# Patient Record
Sex: FEMALE | Race: WHITE | NOT HISPANIC OR LATINO
[De-identification: names, ages, dates, MRNs, and addresses within clinical notes are randomized per-mention and may not be internally consistent; named-entity substitution may affect disease eponyms.]

---

## 2017-01-18 ENCOUNTER — APPOINTMENT (OUTPATIENT)
Dept: BARIATRICS | Facility: CLINIC | Age: 67
End: 2017-01-18

## 2017-01-18 VITALS
SYSTOLIC BLOOD PRESSURE: 140 MMHG | HEIGHT: 61 IN | BODY MASS INDEX: 29.07 KG/M2 | DIASTOLIC BLOOD PRESSURE: 82 MMHG | WEIGHT: 154 LBS

## 2017-02-01 ENCOUNTER — APPOINTMENT (OUTPATIENT)
Dept: GASTROENTEROLOGY | Facility: CLINIC | Age: 67
End: 2017-02-01

## 2017-03-20 ENCOUNTER — APPOINTMENT (OUTPATIENT)
Dept: OPHTHALMOLOGY | Facility: CLINIC | Age: 67
End: 2017-03-20

## 2017-03-20 DIAGNOSIS — H53.8 OTHER VISUAL DISTURBANCES: ICD-10-CM

## 2019-01-09 ENCOUNTER — OUTPATIENT (OUTPATIENT)
Dept: OUTPATIENT SERVICES | Facility: HOSPITAL | Age: 69
LOS: 1 days | End: 2019-01-09
Payer: MEDICARE

## 2019-01-09 ENCOUNTER — APPOINTMENT (OUTPATIENT)
Dept: BARIATRICS | Facility: CLINIC | Age: 69
End: 2019-01-09
Payer: MEDICARE

## 2019-01-09 VITALS
SYSTOLIC BLOOD PRESSURE: 132 MMHG | HEART RATE: 64 BPM | BODY MASS INDEX: 29.51 KG/M2 | WEIGHT: 156.31 LBS | HEIGHT: 61 IN | OXYGEN SATURATION: 99 % | DIASTOLIC BLOOD PRESSURE: 78 MMHG

## 2019-01-09 DIAGNOSIS — K21.9 GASTRO-ESOPHAGEAL REFLUX DISEASE WITHOUT ESOPHAGITIS: ICD-10-CM

## 2019-01-09 DIAGNOSIS — E66.3 OVERWEIGHT: ICD-10-CM

## 2019-01-09 DIAGNOSIS — Z98.84 BARIATRIC SURGERY STATUS: ICD-10-CM

## 2019-01-09 PROCEDURE — 74220 X-RAY XM ESOPHAGUS 1CNTRST: CPT

## 2019-01-09 PROCEDURE — 99214 OFFICE O/P EST MOD 30 MIN: CPT

## 2019-01-09 PROCEDURE — 74220 X-RAY XM ESOPHAGUS 1CNTRST: CPT | Mod: 26

## 2019-01-09 NOTE — REVIEW OF SYSTEMS
[Recent Change In Weight] : ~T recent weight change [Reflux/Heartburn] : reflux/heartburn [Negative] : Endocrine [Fever] : no fever [Chills] : no chills [Dysphagia] : no dysphagia [Abdominal Pain] : no abdominal pain [Vomiting] : no vomiting [Constipation] : no constipation [Diarrhea] : no diarrhea [FreeTextEntry2] : weight gain  [FreeTextEntry7] : history of mild GERD symptoms. Stable .

## 2019-01-09 NOTE — DATA REVIEWED
[FreeTextEntry1] : EXAM: ESOPHAGUS \par \par \par \par \par \par PROCEDURE DATE: 01/09/2019 \par \par \par \par INTERPRETATION: Clinical information: Morbid obesity, bariatric surgery \par status \par \par  view demonstrates lap band left upper quadrant, lap band port, left \par lower quadrant. This exam was video recorded. \par \par Under fluoroscopic observation the patient ingested barium. There was no \par evidence of obstruction. Barium flowed through the esophagus, passed the lap \par band, and filled the stomach. \par \par There was no evidence of prolapse. \par \par 20 minute delayed image shows contrast in the stomach, no passage into \par small bowel. \par \par IMPRESSION: No evidence of prolapse. No evidence of obstruction. \par \par \par \par \par \par \par \par \par SHIRA KAM M.D.,ATTENDING RADIOLOGIST \par This document has been electronically signed. Jan 9 2019 11:33AM \par

## 2019-01-09 NOTE — HISTORY OF PRESENT ILLNESS
[Procedure: ___] : Procedure performed: [unfilled]  [Date of Surgery: ___] : Date of Surgery:   [unfilled] [Surgeon Name:   ___] : Surgeon Name: Dr. OLIVAS [Pre-Op Weight ___] : Pre-op weight was [unfilled] lbs [de-identified] : 68 year old F s/p lap band surgery 10 CM- here for follow up visit. Weight gain since last visit. Pt wants to get back on track and admits she makes some poor food choices at times.Complaints of occasional day time reflux symptoms. Denies nocturnal reflux symptoms. Most recent Upper EGD - 1 year ago - Dr. Knapp - plan to forward report and pathology to office. Pt was instructed to follow up within the next 3 months and discuss whether EGD needs to be repeated. Pt continues to work on consuming consistent protein focus meals and consuming a sufficient amount of zero calorie liquid per day.Currently following up with a private nutritionist.  No change in BM. No nausea or vomiting. Plan to increase daily exercise incorporating cardio and strength training. Pt denies port site pain. Pt is not requesting a slight lap band adjustment today .\par

## 2019-01-09 NOTE — ASSESSMENT
[FreeTextEntry1] : 68 year old F s/p lap band surgery 10 CM- here for follow up visit. Weight gain since last visit. History of mild GERD symptoms. \par \par VE PERFORMED TODAY- NO OBSTRUCTION. NO PROLAPSE. MILD POUCH DILATATION.\par \par No Lap band Adjustment performed today. \par \par Nutritional counseling has been provided. The patient is encouraged to remain calorie conscious and continue a low fat, low carbohydrate, protein focus diet. Pt encouraged to participate in a daily exercise regimen incorporating cardio and  strength training. Encouraged to keep a food journal.\par \par Discussed and reviewed with patient foods to avoid that exacerbate GERD symptoms. Recommended that pt take an OTC H2 blocker or PPI as needed for symptoms. \par \par Return to office in 6 MONTHS after visit with GI / and or repeat Upper EGD.\par

## 2019-01-09 NOTE — PHYSICAL EXAM
[Obese, well nourished, in no acute distress] : obese, well nourished, in no acute distress [Normal] : affect appropriate [de-identified] : normoactive bowel sounds, soft and non tender, no hepatosplenomegaly or masses appreciated.

## 2019-02-10 ENCOUNTER — EMERGENCY (EMERGENCY)
Facility: HOSPITAL | Age: 69
LOS: 1 days | Discharge: HOME | End: 2019-02-10
Attending: EMERGENCY MEDICINE | Admitting: EMERGENCY MEDICINE
Payer: MEDICARE

## 2019-02-10 VITALS
RESPIRATION RATE: 16 BRPM | WEIGHT: 160.06 LBS | TEMPERATURE: 98 F | HEIGHT: 60 IN | OXYGEN SATURATION: 98 % | DIASTOLIC BLOOD PRESSURE: 87 MMHG | SYSTOLIC BLOOD PRESSURE: 152 MMHG | HEART RATE: 64 BPM

## 2019-02-10 DIAGNOSIS — Z96.643 PRESENCE OF ARTIFICIAL HIP JOINT, BILATERAL: Chronic | ICD-10-CM

## 2019-02-10 DIAGNOSIS — Z98.84 BARIATRIC SURGERY STATUS: Chronic | ICD-10-CM

## 2019-02-10 DIAGNOSIS — Z90.710 ACQUIRED ABSENCE OF BOTH CERVIX AND UTERUS: Chronic | ICD-10-CM

## 2019-02-10 LAB
ALBUMIN SERPL ELPH-MCNC: 3.5 G/DL — SIGNIFICANT CHANGE UP (ref 3.3–5)
ALP SERPL-CCNC: 97 U/L — SIGNIFICANT CHANGE UP (ref 30–120)
ALT FLD-CCNC: 64 U/L DA — HIGH (ref 10–60)
ANION GAP SERPL CALC-SCNC: 12 MMOL/L — SIGNIFICANT CHANGE UP (ref 5–17)
APPEARANCE UR: ABNORMAL
APTT BLD: 29 SEC — SIGNIFICANT CHANGE UP (ref 28.5–37)
AST SERPL-CCNC: 36 U/L — SIGNIFICANT CHANGE UP (ref 10–40)
BASOPHILS # BLD AUTO: 0.04 K/UL — SIGNIFICANT CHANGE UP (ref 0–0.2)
BASOPHILS NFR BLD AUTO: 0.6 % — SIGNIFICANT CHANGE UP (ref 0–2)
BILIRUB SERPL-MCNC: 0.2 MG/DL — SIGNIFICANT CHANGE UP (ref 0.2–1.2)
BILIRUB UR-MCNC: NEGATIVE — SIGNIFICANT CHANGE UP
BUN SERPL-MCNC: 23 MG/DL — SIGNIFICANT CHANGE UP (ref 7–23)
CALCIUM SERPL-MCNC: 9.4 MG/DL — SIGNIFICANT CHANGE UP (ref 8.4–10.5)
CHLORIDE SERPL-SCNC: 108 MMOL/L — SIGNIFICANT CHANGE UP (ref 96–108)
CK SERPL-CCNC: 99 U/L — SIGNIFICANT CHANGE UP (ref 26–192)
CO2 SERPL-SCNC: 25 MMOL/L — SIGNIFICANT CHANGE UP (ref 22–31)
COLOR SPEC: YELLOW — SIGNIFICANT CHANGE UP
CREAT SERPL-MCNC: 1.07 MG/DL — SIGNIFICANT CHANGE UP (ref 0.5–1.3)
D DIMER BLD IA.RAPID-MCNC: <150 NG/ML DDU — SIGNIFICANT CHANGE UP
DIFF PNL FLD: ABNORMAL
EOSINOPHIL # BLD AUTO: 0.12 K/UL — SIGNIFICANT CHANGE UP (ref 0–0.5)
EOSINOPHIL NFR BLD AUTO: 1.9 % — SIGNIFICANT CHANGE UP (ref 0–6)
GLUCOSE SERPL-MCNC: 90 MG/DL — SIGNIFICANT CHANGE UP (ref 70–99)
GLUCOSE UR QL: NEGATIVE MG/DL — SIGNIFICANT CHANGE UP
HCT VFR BLD CALC: 40.4 % — SIGNIFICANT CHANGE UP (ref 34.5–45)
HGB BLD-MCNC: 13 G/DL — SIGNIFICANT CHANGE UP (ref 11.5–15.5)
IMM GRANULOCYTES NFR BLD AUTO: 0.2 % — SIGNIFICANT CHANGE UP (ref 0–1.5)
INR BLD: 1.05 RATIO — SIGNIFICANT CHANGE UP (ref 0.88–1.16)
KETONES UR-MCNC: NEGATIVE — SIGNIFICANT CHANGE UP
LEUKOCYTE ESTERASE UR-ACNC: ABNORMAL
LIDOCAIN IGE QN: 261 U/L — SIGNIFICANT CHANGE UP (ref 73–393)
LYMPHOCYTES # BLD AUTO: 2.16 K/UL — SIGNIFICANT CHANGE UP (ref 1–3.3)
LYMPHOCYTES # BLD AUTO: 33.8 % — SIGNIFICANT CHANGE UP (ref 13–44)
MCHC RBC-ENTMCNC: 30.2 PG — SIGNIFICANT CHANGE UP (ref 27–34)
MCHC RBC-ENTMCNC: 32.2 GM/DL — SIGNIFICANT CHANGE UP (ref 32–36)
MCV RBC AUTO: 93.7 FL — SIGNIFICANT CHANGE UP (ref 80–100)
MONOCYTES # BLD AUTO: 0.52 K/UL — SIGNIFICANT CHANGE UP (ref 0–0.9)
MONOCYTES NFR BLD AUTO: 8.1 % — SIGNIFICANT CHANGE UP (ref 2–14)
NEUTROPHILS # BLD AUTO: 3.55 K/UL — SIGNIFICANT CHANGE UP (ref 1.8–7.4)
NEUTROPHILS NFR BLD AUTO: 55.4 % — SIGNIFICANT CHANGE UP (ref 43–77)
NITRITE UR-MCNC: NEGATIVE — SIGNIFICANT CHANGE UP
PH UR: 8 — SIGNIFICANT CHANGE UP (ref 5–8)
PLATELET # BLD AUTO: 222 K/UL — SIGNIFICANT CHANGE UP (ref 150–400)
POTASSIUM SERPL-MCNC: 3.7 MMOL/L — SIGNIFICANT CHANGE UP (ref 3.5–5.3)
POTASSIUM SERPL-SCNC: 3.7 MMOL/L — SIGNIFICANT CHANGE UP (ref 3.5–5.3)
PROT SERPL-MCNC: 6.9 G/DL — SIGNIFICANT CHANGE UP (ref 6–8.3)
PROT UR-MCNC: NEGATIVE MG/DL — SIGNIFICANT CHANGE UP
PROTHROM AB SERPL-ACNC: 11.5 SEC — SIGNIFICANT CHANGE UP (ref 10–12.9)
RBC # BLD: 4.31 M/UL — SIGNIFICANT CHANGE UP (ref 3.8–5.2)
RBC # FLD: 13.1 % — SIGNIFICANT CHANGE UP (ref 10.3–14.5)
SODIUM SERPL-SCNC: 145 MMOL/L — SIGNIFICANT CHANGE UP (ref 135–145)
SP GR SPEC: 1.01 — SIGNIFICANT CHANGE UP (ref 1.01–1.02)
TROPONIN I SERPL-MCNC: 0 NG/ML — LOW (ref 0.02–0.06)
TROPONIN I SERPL-MCNC: 0 NG/ML — LOW (ref 0.02–0.06)
UROBILINOGEN FLD QL: NEGATIVE MG/DL — SIGNIFICANT CHANGE UP
WBC # BLD: 6.4 K/UL — SIGNIFICANT CHANGE UP (ref 3.8–10.5)
WBC # FLD AUTO: 6.4 K/UL — SIGNIFICANT CHANGE UP (ref 3.8–10.5)

## 2019-02-10 PROCEDURE — 71046 X-RAY EXAM CHEST 2 VIEWS: CPT | Mod: 26

## 2019-02-10 PROCEDURE — 99284 EMERGENCY DEPT VISIT MOD MDM: CPT

## 2019-02-10 PROCEDURE — 93010 ELECTROCARDIOGRAM REPORT: CPT

## 2019-02-10 PROCEDURE — 74019 RADEX ABDOMEN 2 VIEWS: CPT | Mod: 26

## 2019-02-10 RX ORDER — PANTOPRAZOLE SODIUM 20 MG/1
40 TABLET, DELAYED RELEASE ORAL ONCE
Qty: 0 | Refills: 0 | Status: COMPLETED | OUTPATIENT
Start: 2019-02-10 | End: 2019-02-10

## 2019-02-10 RX ORDER — SODIUM CHLORIDE 9 MG/ML
1000 INJECTION INTRAMUSCULAR; INTRAVENOUS; SUBCUTANEOUS ONCE
Qty: 0 | Refills: 0 | Status: COMPLETED | OUTPATIENT
Start: 2019-02-10 | End: 2019-02-10

## 2019-02-10 RX ORDER — FAMOTIDINE 10 MG/ML
20 INJECTION INTRAVENOUS ONCE
Qty: 0 | Refills: 0 | Status: COMPLETED | OUTPATIENT
Start: 2019-02-10 | End: 2019-02-10

## 2019-02-10 RX ORDER — SODIUM CHLORIDE 9 MG/ML
3 INJECTION INTRAMUSCULAR; INTRAVENOUS; SUBCUTANEOUS ONCE
Qty: 0 | Refills: 0 | Status: COMPLETED | OUTPATIENT
Start: 2019-02-10 | End: 2019-02-10

## 2019-02-10 RX ADMIN — SODIUM CHLORIDE 3 MILLILITER(S): 9 INJECTION INTRAMUSCULAR; INTRAVENOUS; SUBCUTANEOUS at 23:10

## 2019-02-10 RX ADMIN — SODIUM CHLORIDE 1000 MILLILITER(S): 9 INJECTION INTRAMUSCULAR; INTRAVENOUS; SUBCUTANEOUS at 23:10

## 2019-02-10 RX ADMIN — FAMOTIDINE 20 MILLIGRAM(S): 10 INJECTION INTRAVENOUS at 23:18

## 2019-02-10 RX ADMIN — PANTOPRAZOLE SODIUM 40 MILLIGRAM(S): 20 TABLET, DELAYED RELEASE ORAL at 23:18

## 2019-02-10 NOTE — ED PROVIDER NOTE - CLINICAL SUMMARY MEDICAL DECISION MAKING FREE TEXT BOX
68 y.o. F with lap band c/o epigastric pain, related to lap band vs atypical cardiac presentation - iv, labs, ekg, cxr/axr, will d/w bariatric service and reassess

## 2019-02-10 NOTE — ED ADULT TRIAGE NOTE - PAIN RATING/NUMBER SCALE (0-10): ACTIVITY
CC:  Marya Grant is here today for vaginal check, wart treatment.  Patient states she feels like it is worse.   Medications: medications verified and updated  Refills needed today?  NO  denies Latex allergy or sensitivity  Tobacco history: verified    PMD - Delano De La Rosa MD      Patient would like communication of their results via:    [facility]    Patient's current [facility] status: Active.  Health Maintenance   Topic Date Due   • Depression Screening  02/24/1994   • Influenza Vaccine (1) 09/01/2017   • DTaP/Tdap/Td Vaccine (2 - Td) 04/10/2018   • Cervical Cancer Screening HPV CO-Testing  06/25/2019        6

## 2019-02-10 NOTE — ED ADULT NURSE NOTE - OBJECTIVE STATEMENT
Patient states she over ate yesterday describes it as a binge. Tonight she has pain under her left breast in the area of her lap band and her left shoulder. with nausea and belching with foul taste and smell.

## 2019-02-10 NOTE — ED ADULT NURSE NOTE - NSIMPLEMENTINTERV_GEN_ALL_ED
Implemented All Universal Safety Interventions:  [place] to call system. Call bell, personal items and telephone within reach. Instruct patient to call for assistance. Room bathroom lighting operational. Non-slip footwear when patient is off stretcher. Physically safe environment: no spills, clutter or unnecessary equipment. Stretcher in lowest position, wheels locked, appropriate side rails in place.

## 2019-02-10 NOTE — ED ADULT NURSE NOTE - CHIEF COMPLAINT QUOTE
" Every time I inhale ,  I have pain under my left breast area - and sometimes over my  left shoulder - started few hours ago. " Hx lap band 2003

## 2019-02-10 NOTE — ED ADULT NURSE NOTE - ED STAT RN HANDOFF DETAILS
Patient in x-ray, labs pending results. IV infusing as ordered. patient endorsed to Eric Phillips RN

## 2019-02-10 NOTE — ED ADULT NURSE NOTE - GENITOURINARY ASSESSMENT
Left message for patient to call office back and schedule appointment with Christian Modi prior to additional refills. Please assist him in scheduling appointment. - - -

## 2019-02-10 NOTE — ED PROVIDER NOTE - OBJECTIVE STATEMENT
69 y/o F pt with hx of lap band, hysterectomy, b/l hip replacements, hypothyroidism, GERD, and depression presents to the ED c/o pain under the left breast with deep breathing since 6pm today. Pain also radiates to the left shoulder pain. 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 Pt states that she ate a larger quantity of food than normal last night for dinner and felt uncomfortable. Pt states that she ate some chili today for dinner and then went to her chorus practice. Pt states that she called a hotline and was referred to the ED for workup. She states that the pain has improved since arrival to the ED. Pt states that she had a bariatric work-up a couple of weeks ago in [place] ED. Non-smoker, no EtOH use. Allergic to penicillin. Denies fever, chills, nausea, vomiting, or chest pain. No other complaints at this time.    Dr. Jiménez- gastric surgeon

## 2019-02-11 VITALS
TEMPERATURE: 98 F | DIASTOLIC BLOOD PRESSURE: 80 MMHG | RESPIRATION RATE: 16 BRPM | OXYGEN SATURATION: 99 % | HEART RATE: 67 BPM | SYSTOLIC BLOOD PRESSURE: 135 MMHG

## 2019-02-11 PROCEDURE — 74019 RADEX ABDOMEN 2 VIEWS: CPT

## 2019-02-11 PROCEDURE — 93005 ELECTROCARDIOGRAM TRACING: CPT

## 2019-02-11 PROCEDURE — 82550 ASSAY OF CK (CPK): CPT

## 2019-02-11 PROCEDURE — 96361 HYDRATE IV INFUSION ADD-ON: CPT

## 2019-02-11 PROCEDURE — 96374 THER/PROPH/DIAG INJ IV PUSH: CPT

## 2019-02-11 PROCEDURE — 81001 URINALYSIS AUTO W/SCOPE: CPT

## 2019-02-11 PROCEDURE — 99284 EMERGENCY DEPT VISIT MOD MDM: CPT | Mod: 25

## 2019-02-11 PROCEDURE — 80053 COMPREHEN METABOLIC PANEL: CPT

## 2019-02-11 PROCEDURE — 36415 COLL VENOUS BLD VENIPUNCTURE: CPT

## 2019-02-11 PROCEDURE — 83690 ASSAY OF LIPASE: CPT

## 2019-02-11 PROCEDURE — 85730 THROMBOPLASTIN TIME PARTIAL: CPT

## 2019-02-11 PROCEDURE — 85027 COMPLETE CBC AUTOMATED: CPT

## 2019-02-11 PROCEDURE — 71046 X-RAY EXAM CHEST 2 VIEWS: CPT

## 2019-02-11 PROCEDURE — 85379 FIBRIN DEGRADATION QUANT: CPT

## 2019-02-11 PROCEDURE — 84484 ASSAY OF TROPONIN QUANT: CPT

## 2019-02-11 PROCEDURE — 85610 PROTHROMBIN TIME: CPT

## 2019-02-11 RX ADMIN — SODIUM CHLORIDE 1000 MILLILITER(S): 9 INJECTION INTRAMUSCULAR; INTRAVENOUS; SUBCUTANEOUS at 00:10

## 2019-02-11 RX ADMIN — Medication 30 MILLILITER(S): at 00:10

## 2019-04-09 ENCOUNTER — EMERGENCY (EMERGENCY)
Facility: HOSPITAL | Age: 69
LOS: 1 days | Discharge: HOME | End: 2019-04-09
Attending: EMERGENCY MEDICINE | Admitting: STUDENT IN AN ORGANIZED HEALTH CARE EDUCATION/TRAINING PROGRAM
Payer: MEDICARE

## 2019-04-09 VITALS
DIASTOLIC BLOOD PRESSURE: 85 MMHG | HEART RATE: 59 BPM | RESPIRATION RATE: 18 BRPM | WEIGHT: 160.06 LBS | OXYGEN SATURATION: 97 % | TEMPERATURE: 98 F | SYSTOLIC BLOOD PRESSURE: 151 MMHG

## 2019-04-09 DIAGNOSIS — Z90.710 ACQUIRED ABSENCE OF BOTH CERVIX AND UTERUS: Chronic | ICD-10-CM

## 2019-04-09 DIAGNOSIS — Z98.84 BARIATRIC SURGERY STATUS: Chronic | ICD-10-CM

## 2019-04-09 DIAGNOSIS — Z96.643 PRESENCE OF ARTIFICIAL HIP JOINT, BILATERAL: Chronic | ICD-10-CM

## 2019-04-09 PROCEDURE — 99285 EMERGENCY DEPT VISIT HI MDM: CPT | Mod: 25

## 2019-04-09 PROCEDURE — 99156 MOD SED OTH PHYS/QHP 5/>YRS: CPT

## 2019-04-09 PROCEDURE — 73502 X-RAY EXAM HIP UNI 2-3 VIEWS: CPT | Mod: 26,RT

## 2019-04-09 PROCEDURE — 27266 TREAT HIP DISLOCATION: CPT | Mod: RT

## 2019-04-09 PROCEDURE — 73502 X-RAY EXAM HIP UNI 2-3 VIEWS: CPT

## 2019-04-09 PROCEDURE — 99284 EMERGENCY DEPT VISIT MOD MDM: CPT

## 2019-04-09 NOTE — CONSULT NOTE ADULT - SUBJECTIVE AND OBJECTIVE BOX
Patient is a 68F community ambulator without assistive devices who presents to the ED today for a c/o of R hip pain. Patient states she was at the gym when she tripped over a scale and fell onto her right side. She states upon falling she felt a pop in her right hip and was unable to bear weight on her RLE after the injury. Denies HH/LOC. Denies any numbness or tingling. Denies having any other pain elsewhere. Patient is s/p R Hip Hemiarthroplasty w/ Cerclage wire by Dr. Parra ([facility], 2005) and L Hip Hemiarthroplasty by Dr. Parra ([facility], 2004). She admits to 6 or 7 prior dislocations of her R hip usually caused by getting into/out of a car but states it has always "popped back in' and never required a formal reduction. No other orthopedic concerns at this time.    PAST MEDICAL & SURGICAL HISTORY:    Home Medications:    Allergies  penicillins (Rash)    PHYSICAL EXAM:  Vital Signs Last 24 Hrs  T(C): 36.9 (09 Apr 2019 23:08), Max: 36.9 (09 Apr 2019 18:54)  T(F): 98.5 (09 Apr 2019 23:08), Max: 98.5 (09 Apr 2019 23:08)  HR: 76 (09 Apr 2019 23:08) (59 - 76)  BP: 127/81 (09 Apr 2019 23:08) (127/81 - 151/85)  BP(mean): --  RR: 16 (09 Apr 2019 23:08) (16 - 18)  SpO2: 96% (09 Apr 2019 23:08) (96% - 97%)    Gen: NAD; Resting comfortably  RLE:  Shortened and internally rotated  Skin intact; No erythema or ecchymosis  +ttp diffusely around hip  No other bony tenderness to palpation  +EHL/FHL/TA/GSC  SILT L3-S1  +DP  Compartments soft and compressible  No calf tenderness    Secondary Survey:   LLE/RUE/LUE: No TTP over bony prominences, SILT, palpable pulses, full/painless range of motion, compartments soft    Spine: No bony tenderness. No palpable stepoffs.     XR R Hip: Posterior hip dislocation    Procedure:  After informed consent was obtained from the patient, conscious sedation was administered by Anesthesia, and a closed reduction maneuver of her right hip was attempted. Following the reduction, post-reduction xrays were obtained which demonstrated a sucessful reduction and adequate anatomic alignment. Patient was NVI preprocedure and post-procedure. Patient tolerated procedure well without any acute complications. Patient is a 68F community ambulator without assistive devices who presents to the ED today for a c/o of R hip pain. Patient states she was at the gym when she tripped over a scale and fell onto her right side. She states upon falling she felt a pop in her right hip and was unable to bear weight on her RLE after the injury. Denies HH/LOC. Denies any numbness or tingling. Denies having any other pain elsewhere. Patient is s/p R Total Hip Arthroplasty w/ Cerclage wire by Dr. Parra (\[facility]", 2005) and L Total Hip Arthroplasty by Dr. Parra (\[facility]", 2004). She admits to 6 or 7 prior dislocations of her R hip usually caused by getting into/out of a car but states it has always "popped back in' and never required a formal reduction. No other orthopedic concerns at this time.    PAST MEDICAL & SURGICAL HISTORY:    Home Medications:    Allergies  penicillins (Rash)    PHYSICAL EXAM:  Vital Signs Last 24 Hrs  T(C): 36.9 (09 Apr 2019 23:08), Max: 36.9 (09 Apr 2019 18:54)  T(F): 98.5 (09 Apr 2019 23:08), Max: 98.5 (09 Apr 2019 23:08)  HR: 76 (09 Apr 2019 23:08) (59 - 76)  BP: 127/81 (09 Apr 2019 23:08) (127/81 - 151/85)  BP(mean): --  RR: 16 (09 Apr 2019 23:08) (16 - 18)  SpO2: 96% (09 Apr 2019 23:08) (96% - 97%)    Gen: NAD; Resting comfortably  RLE:  Shortened and internally rotated  Skin intact; No erythema or ecchymosis  +ttp diffusely around hip  No other bony tenderness to palpation  +EHL/FHL/TA/GSC  SILT L3-S1  +DP  Compartments soft and compressible  No calf tenderness    Secondary Survey:   LLE/RUE/LUE: No TTP over bony prominences, SILT, palpable pulses, full/painless range of motion, compartments soft    Spine: No bony tenderness. No palpable stepoffs.     XR R Hip: Posterior hip dislocation    Procedure:  After informed consent was obtained from the patient, conscious sedation was administered by Anesthesia, and a closed reduction maneuver of her right hip was attempted. Following the reduction, post-reduction xrays were obtained which demonstrated a sucessful reduction and adequate anatomic alignment. Patient was NVI preprocedure and post-procedure. Patient tolerated procedure well without any acute complications. Patient is a 68F community ambulator without assistive devices who presents to the ED today for a c/o of R hip pain. Patient states she was at the gym when she tripped over a scale and fell onto her right side. She states upon falling she felt a pop in her right hip and was unable to bear weight on her RLE after the injury. Denies HH/LOC. Denies any numbness or tingling. Denies having any other pain elsewhere. Patient is s/p R Total Hip Arthroplasty w/ Cerclage wire by Dr. Parra ([facility], 2005) and L Total Hip Arthroplasty by Dr. Parra ([facility], 2004). She admits to 6 or 7 prior dislocations of her R hip usually caused by getting into/out of a car but states it has always "popped back in' and never required a formal reduction. No other orthopedic concerns at this time.    PAST MEDICAL & SURGICAL HISTORY:  Hypertension  S/P bilateral hip replacements  Depression  History of hypothyroidism  S/P bilateral hip replacements  H/O bilateral hip replacements  History of hysterectomy  H/O bariatric surgery: lap band 2003      Home Medications:  buPROPion 300 mg/24 hours (XL) oral tablet, extended release: 1 tab(s) orally every 24 hours (10 Feb 2019 22:23)  Calcium 600+D oral tablet: 1 tab(s) orally once a day (10 Feb 2019 22:23)  CoQ10: 1 tab(s) orally once a day (10 Feb 2019 22:23)  FLUoxetine: 80 milligram(s) orally once a day (10 Feb 2019 22:23)  levothyroxine 50 mcg (0.05 mg)/mL oral solution: 1 tab(s) orally once a day (10 Feb 2019 22:23)  topiramate 200 mg oral tablet: 1 tab(s) orally 2 times a day (10 Feb 2019 22:23)  Vitamin C 500 mg oral tablet: 1 tab(s) orally once a day (10 Feb 2019 22:23)  Zantac 150 oral tablet: 1 tab(s) orally once a day (10 Feb 2019 22:23)      Allergies  penicillins (Rash)    PHYSICAL EXAM:  Vital Signs Last 24 Hrs  T(C): 36.9 (09 Apr 2019 23:08), Max: 36.9 (09 Apr 2019 18:54)  T(F): 98.5 (09 Apr 2019 23:08), Max: 98.5 (09 Apr 2019 23:08)  HR: 76 (09 Apr 2019 23:08) (59 - 76)  BP: 127/81 (09 Apr 2019 23:08) (127/81 - 151/85)  BP(mean): --  RR: 16 (09 Apr 2019 23:08) (16 - 18)  SpO2: 96% (09 Apr 2019 23:08) (96% - 97%)    Gen: NAD; Resting comfortably  RLE:  Shortened and internally rotated  Skin intact; No erythema or ecchymosis  +ttp diffusely around hip  No other bony tenderness to palpation  +EHL/FHL/TA/GSC  SILT L3-S1  +DP  Compartments soft and compressible  No calf tenderness    Secondary Survey:   LLE/RUE/LUE: No TTP over bony prominences, SILT, palpable pulses, full/painless range of motion, compartments soft    Spine: No bony tenderness. No palpable stepoffs.     XR R Hip: Posterior hip dislocation    Procedure:  After informed consent was obtained from the patient, conscious sedation was administered by Anesthesia, and a closed reduction maneuver of her right hip was attempted. Following the reduction, post-reduction xrays were obtained which demonstrated a sucessful reduction and adequate anatomic alignment. Patient was NVI preprocedure and post-procedure. Patient tolerated procedure well without any acute complications.

## 2019-04-09 NOTE — ED PROVIDER NOTE - NSFOLLOWUPINSTRUCTIONS_ED_ALL_ED_FT
Follow-up at [facility] tomorrow  Wear Knee immobilizer with knee completely extended until evaluated by your Orthopedic Surgeon  Return here if needed

## 2019-04-09 NOTE — ED ADULT TRIAGE NOTE - CHIEF COMPLAINT QUOTE
fell hip pain Bilateral Helical Rim Advancement Flap Text: The defect edges were debeveled with a #15 blade scalpel.  Given the location of the defect and the proximity to free margins (helical rim) a bilateral helical rim advancement flap was deemed most appropriate.  Using a sterile surgical marker, the appropriate advancement flaps were drawn incorporating the defect and placing the expected incisions between the helical rim and antihelix where possible.  The area thus outlined was incised through and through with a #15 scalpel blade.  With a skin hook and iris scissors, the flaps were gently and sharply undermined and freed up.

## 2019-04-09 NOTE — ED PROVIDER NOTE - CONSTITUTIONAL, MLM
normal... Well appearing, older female, well nourished, awake, alert, oriented to person, place, time/situation and in mild apparent distress.

## 2019-04-09 NOTE — ED ADULT NURSE REASSESSMENT NOTE - NS ED NURSE REASSESS COMMENT FT1
Medication pending, pharmacy loaded.   Orthopedic Doctors Remy and Anesthesiologist Dr. Kessler at bedside for conscious sedation procedure, closed reduction right hip.

## 2019-04-09 NOTE — ED PROVIDER NOTE - PROVIDER TOKENS
FREE:[LAST:[[facility]],PHONE:[(   )    -],FAX:[(   )    -]]

## 2019-04-09 NOTE — ED PROVIDER NOTE - PROGRESS NOTE DETAILS
Anesthesia and Ortho service to reduce hip. Alert, awake, ambulated in ED.  Reassessed by anesthesia. Family at bedside to take patient home.

## 2019-04-09 NOTE — ED ADULT NURSE NOTE - NSIMPLEMENTINTERV_GEN_ALL_ED
Implemented All Fall Risk Interventions:  [place] to call system. Call bell, personal items and telephone within reach. Instruct patient to call for assistance. Room bathroom lighting operational. Non-slip footwear when patient is off stretcher. Physically safe environment: no spills, clutter or unnecessary equipment. Stretcher in lowest position, wheels locked, appropriate side rails in place. Provide visual cue, wrist band, yellow gown, etc. Monitor gait and stability. Monitor for mental status changes and reorient to person, place, and time. Review medications for side effects contributing to fall risk. Reinforce activity limits and safety measures with patient and family.

## 2019-04-09 NOTE — ED PROVIDER NOTE - OBJECTIVE STATEMENT
69 yo white female with previous hip surgery and subsequent hip dislocations who tripped this evening and hip right hip. Munising like hip popped out again. Since that time, pain is present and sharp in nature. Unable to bear any weight. Last ate at 1600 today.

## 2019-04-09 NOTE — ED PROVIDER NOTE - CARE PROVIDER_API CALL
[facility],   Phone: (   )    -  Fax: (   )    -  Follow Up Time:

## 2019-04-09 NOTE — CONSULT NOTE ADULT - ASSESSMENT
A/P: 68F w/ R Posterior Hip Dislocation s/p Closed Reduction  Discussed with patient that given history of recurrent dislocations, patient will likely need future revision surgery for possible constrained liner  Abduction/Posterior precautions  Bulky [place]/BROOK  Analgesia  DVT ppx  WBAT in KI  PT/OT  FU with orthopedic surgeon within 1 week  Will discuss with attending and advise if plan changes   Patient aware and in agreement with plan

## 2019-04-10 VITALS
RESPIRATION RATE: 16 BRPM | HEART RATE: 70 BPM | SYSTOLIC BLOOD PRESSURE: 129 MMHG | OXYGEN SATURATION: 96 % | DIASTOLIC BLOOD PRESSURE: 70 MMHG

## 2019-04-10 PROBLEM — F32.9 MAJOR DEPRESSIVE DISORDER, SINGLE EPISODE, UNSPECIFIED: Chronic | Status: ACTIVE | Noted: 2019-02-10

## 2019-04-10 PROBLEM — Z86.39 PERSONAL HISTORY OF OTHER ENDOCRINE, NUTRITIONAL AND METABOLIC DISEASE: Chronic | Status: ACTIVE | Noted: 2019-02-10

## 2019-04-10 PROCEDURE — 73502 X-RAY EXAM HIP UNI 2-3 VIEWS: CPT | Mod: 26,RT

## 2019-04-10 NOTE — ED ADULT NURSE REASSESSMENT NOTE - NS ED NURSE REASSESS COMMENT FT1
Patient went home on stable condition accompanied by  who will drive her home. Patient on knee immobilizer and advised to kept extended until seen by Orthopedics. IV cannula removed.

## 2019-07-02 NOTE — ED ADULT TRIAGE NOTE - BSA (M2)
DEXA still shows osteoporosis despite treatment with Reclast  Will readdress on next visit about other treatment
1.7

## 2019-07-18 PROBLEM — I10 ESSENTIAL (PRIMARY) HYPERTENSION: Chronic | Status: ACTIVE | Noted: 2019-04-09

## 2019-07-23 ENCOUNTER — APPOINTMENT (OUTPATIENT)
Dept: BARIATRICS | Facility: CLINIC | Age: 69
End: 2019-07-23
Payer: MEDICARE

## 2019-07-23 VITALS
HEIGHT: 61 IN | BODY MASS INDEX: 29.47 KG/M2 | WEIGHT: 156.08 LBS | SYSTOLIC BLOOD PRESSURE: 140 MMHG | HEART RATE: 72 BPM | OXYGEN SATURATION: 98 % | DIASTOLIC BLOOD PRESSURE: 80 MMHG

## 2019-07-23 PROCEDURE — 99214 OFFICE O/P EST MOD 30 MIN: CPT

## 2019-07-25 NOTE — ASSESSMENT
[FreeTextEntry1] : 68 year old F s/p lap band surgery 10 CM- here for follow up visit. Weight stable since last visit. Pt reports occasional port site discomfort with core exercises / bending forward. Pt reports discomfort has improved significantly in the last few days. \par \par No Lap band Adjustment performed today. \par \par Nutritional counseling has been provided. The patient is encouraged to remain calorie conscious and continue a low fat, low carbohydrate, protein focus diet. Pt encouraged to participate in a daily exercise regimen incorporating cardio and  strength training. Encouraged to keep a food journal. Pt instructed to apply ice post exercise as needed. \par \par Pt instructed to call office in 1 week to give update on status. \par \par Return to office in 3- 6 MONTHS or sooner if any issues or concerns. Will consider repeating VE next visit if any issues or concerns. \par

## 2019-07-25 NOTE — HISTORY OF PRESENT ILLNESS
[Procedure: ___] : Procedure performed: [unfilled]  [Date of Surgery: ___] : Date of Surgery:   [unfilled] [Surgeon Name:   ___] : Surgeon Name: Dr. OLIVAS [Pre-Op Weight ___] : Pre-op weight was [unfilled] lbs [de-identified] : 68 year old F s/p lap band surgery 10 CM- here for follow up visit. Weight stable since last visit. Pt reports occasional port site discomfort with core exercises / bending forward. Denies any recent trauma. Denies any swelling or redness along area.  Pt continues to work on getting back on track and making healthier food choices since last week.  Denies any reflux symptoms. Pt continues to work on consuming consistent protein focus meals and consuming a sufficient amount of zero calorie liquid per day.Currently following up with a private nutritionist.  No change in BM. No nausea or vomiting. Plan to increase daily exercise incorporating cardio and strength training. Pt denies port site pain. Pt is not requesting a slight lap band adjustment today and is satisfied with level of restriction . History of  Upper EGD - 1 year ago - Dr. Knapp . \par \par Most recent Video Esophagram - 1/9/2019 no obstruction no prolapse. no pouch dilatation. \par

## 2019-07-25 NOTE — REVIEW OF SYSTEMS
[Recent Change In Weight] : ~T recent weight change [Abdominal Pain] : abdominal pain [Negative] : Endocrine [Fever] : no fever [Chills] : no chills [Dysphagia] : no dysphagia [Vomiting] : no vomiting [Constipation] : no constipation [Diarrhea] : no diarrhea [Reflux/Heartburn] : no reflux/ heartburn [FreeTextEntry2] : weight stable [FreeTextEntry7] : occasional port site discomfort with core activity / forward flexion.

## 2019-07-25 NOTE — PHYSICAL EXAM
[Obese, well nourished, in no acute distress] : obese, well nourished, in no acute distress [Normal] : grossly intact [de-identified] : normoactive bowel sounds, soft and non tender, no hepatosplenomegaly or masses appreciated.

## 2019-08-05 ENCOUNTER — APPOINTMENT (OUTPATIENT)
Dept: BARIATRICS | Facility: CLINIC | Age: 69
End: 2019-08-05
Payer: MEDICARE

## 2019-08-05 VITALS
HEART RATE: 71 BPM | OXYGEN SATURATION: 98 % | BODY MASS INDEX: 29.51 KG/M2 | HEIGHT: 61 IN | WEIGHT: 156.31 LBS | SYSTOLIC BLOOD PRESSURE: 120 MMHG | DIASTOLIC BLOOD PRESSURE: 80 MMHG

## 2019-08-05 PROCEDURE — 99214 OFFICE O/P EST MOD 30 MIN: CPT

## 2019-08-05 NOTE — REVIEW OF SYSTEMS
[Recent Change In Weight] : ~T recent weight change [Abdominal Pain] : abdominal pain [Negative] : Endocrine [Fever] : no fever [Chills] : no chills [Dysphagia] : no dysphagia [Constipation] : no constipation [Diarrhea] : no diarrhea [Vomiting] : no vomiting [FreeTextEntry2] : weight stable [Reflux/Heartburn] : no reflux/ heartburn [FreeTextEntry7] : occasional port site discomfort with core activity / forward flexion.

## 2019-08-05 NOTE — HISTORY OF PRESENT ILLNESS
[Procedure: ___] : Procedure performed: [unfilled]  [Date of Surgery: ___] : Date of Surgery:   [unfilled] [Surgeon Name:   ___] : Surgeon Name: Dr. OLIVAS [Pre-Op Weight ___] : Pre-op weight was [unfilled] lbs [de-identified] : 68 year old F s/p lap band surgery 10 CM- here for follow up visit. Weight stable since last visit. Pt here for follow up visit secondary to  occasional port site discomfort with core exercises / bending forward- pt states discomfort has increased recently since last visit.. Denies any recent trauma. Slight improvement in discomfort since last visit.Denies any swelling or redness along area.  Pt continues to work on getting back on track and making healthier food choices since last week.  Denies any reflux symptoms. Pt continues to work on consuming consistent protein focus meals and consuming a sufficient amount of zero calorie liquid per day.Currently following up with a private nutritionist.  No change in BM. No nausea or vomiting. Patient exercising regularly - incorporating cardio and strength training. Pt denies port site pain. Pt is not requesting a slight lap band adjustment today and is satisfied with level of restriction . History of  Upper EGD - - 3/27/2019- small sliding hiatal hernia/ no evidence of Vides's . Findings suggestive of delayed gastric emptying .Pathology was negative-  No metaplasia or dysplasia on pathology report . Pt is not interested in port site revision unless absolutely necessary. \par \par Most recent Video Esophagram - 1/9/2019 no obstruction no prolapse. no pouch dilatation. \par

## 2019-08-05 NOTE — ASSESSMENT
[FreeTextEntry1] : 68 year old F s/p lap band surgery 10 CM- here for follow up visit. Weight stable since last visit. Pt reports occasional port site discomfort with core exercises / bending forward. Pt reports discomfort has improved significantly in the last few days. \par \par No Lap band Adjustment performed today. \par \par Nutritional counseling has been provided. The patient is encouraged to remain calorie conscious and continue a low fat, low carbohydrate, protein focus diet. Pt encouraged to participate in a daily exercise regimen incorporating cardio and  strength training. Encouraged to keep a food journal. Pt instructed to apply ice post exercise as needed. \par \par Pt was also seen by Dr. Knowles . Answered all questions and concerns. \par \par Return to office in 3- 6 MONTHS or sooner if any issues or concerns. Will consider imaging next visit - including abdominal flat / upright/ crosstable view if symptoms persist or worsen as discussed with Dr. Knowles.

## 2020-09-14 ENCOUNTER — APPOINTMENT (OUTPATIENT)
Dept: PHARMACY | Facility: CLINIC | Age: 70
End: 2020-09-14
Payer: MEDICARE

## 2020-09-14 PROCEDURE — V5299A: CUSTOM | Mod: NC

## 2020-11-18 ENCOUNTER — APPOINTMENT (OUTPATIENT)
Dept: BARIATRICS | Facility: CLINIC | Age: 70
End: 2020-11-18
Payer: MEDICARE

## 2020-11-18 VITALS
TEMPERATURE: 97.1 F | HEIGHT: 61 IN | HEART RATE: 64 BPM | DIASTOLIC BLOOD PRESSURE: 80 MMHG | BODY MASS INDEX: 28.35 KG/M2 | OXYGEN SATURATION: 98 % | WEIGHT: 150.13 LBS | SYSTOLIC BLOOD PRESSURE: 118 MMHG

## 2020-11-18 PROCEDURE — 99214 OFFICE O/P EST MOD 30 MIN: CPT

## 2020-11-19 NOTE — HISTORY OF PRESENT ILLNESS
[Procedure: ___] : Procedure performed: [unfilled]  [Date of Surgery: ___] : Date of Surgery:   [unfilled] [Surgeon Name:   ___] : Surgeon Name: Dr. OLIVAS [Pre-Op Weight ___] : Pre-op weight was [unfilled] lbs [de-identified] : 70 year old F s/p lap band surgery 10 CM- here for follow up visit. Pt was last seen on 8/9/2019. Current weight is 150 lbs. Weight loss since last visit. Pt here for follow up visit. Pt denies any issues or concerns with lap band. Most recent Video Esophagram was performed on 1/9/2019- Normal. No obstruction No prolapse. No pouch dilatation. Pt is not requesting a lap band adjustment today. Pt currently has 1.3 cc of fluid in lap band. Denies any recent trauma. Pt denies any port site discomfort. Pt reports some discomfort related to a significant amount of access skin along the abdomen and pelvic region due to weight loss.Pt continues to work on getting back on track and making healthier food choices since last week. Pt reports occasional reflux symptoms. Pt continues to work on consuming consistent protein focus meals and consuming a sufficient amount of zero calorie liquid per day.Currently following up with a private nutritionist.  No change in BM. No nausea or vomiting. Patient exercising regularly - incorporating cardio and strength training. Pt denies port site pain. Pt is not requesting a slight lap band adjustment today and is satisfied with level of restriction . History of  Upper EGD - - 3/27/2019- small sliding hiatal hernia/ no evidence of Vides's . Findings suggestive of delayed gastric emptying .Pathology was negative-  No metaplasia or dysplasia on pathology report . Pt is not interested in port site revision unless absolutely necessary. \par \par Most recent Video Esophagram - 1/9/2019 no obstruction no prolapse. no pouch dilatation. \par

## 2020-11-19 NOTE — REVIEW OF SYSTEMS
[Recent Change In Weight] : ~T recent weight change [Reflux/Heartburn] : reflux/heartburn [Negative] : Neurological [Fever] : no fever [Chills] : no chills [Dysphagia] : no dysphagia [Hoarseness] : no hoarseness [Chest Pain] : no chest pain [Palpitations] : no palpitations [Lower Ext Edema] : no lower extremity edema [Shortness Of Breath] : no shortness of breath [Wheezing] : no wheezing [Cough] : no cough [SOB on Exertion] : no shortness of breath during exertion [Abdominal Pain] : no abdominal pain [Vomiting] : no vomiting [Constipation] : no constipation [Diarrhea] : no diarrhea [FreeTextEntry2] : weight loss since last visit.  [FreeTextEntry7] : mild reflux symptoms

## 2020-11-19 NOTE — ASSESSMENT
[FreeTextEntry1] : 70 year old F s/p lap band surgery 10 CM- here for follow up visit. Pt was last seen on 8/9/2019. Current weight is 150 lbs. Weight loss since last visit. Pt here for follow up visit. Pt denies any issues or concerns with lap band. History of a small hiatal hernia/ mild GERD symptoms.\par \par Most recent Video Esophagram was performed on 1/9/2019- Normal. No obstruction No prolapse. No pouch dilatation. \par \par No Lap band Adjustment performed today. Pt currently has 1.3 cc of fluid in lap band. Pt is satisfied with level of restriction from lap band. \par \par Discussed and reviewed with patient foods to avoid that exacerbate GERD symptoms. Recommended that pt take an OTC H2 blocker or PPI as needed for symptoms. \par \par Nutritional counseling has been provided. The patient is encouraged to remain calorie conscious and continue a low fat, low carbohydrate, protein focus diet. Pt encouraged to participate in a daily exercise regimen incorporating cardio and  strength training. Encouraged to keep a food journal. Pt instructed to apply ice post exercise as needed. \par \par Return to office in  6 MONTHS or sooner if any issues or concerns. Plan to repeat VE prior to next visit - fasting . Discussed and reviewed with patient.

## 2020-12-17 ENCOUNTER — RX RENEWAL (OUTPATIENT)
Age: 70
End: 2020-12-17

## 2020-12-17 DIAGNOSIS — K21.9 GASTRO-ESOPHAGEAL REFLUX DISEASE W/OUT ESOPHAGITIS: ICD-10-CM

## 2021-01-15 ENCOUNTER — APPOINTMENT (OUTPATIENT)
Dept: PHARMACY | Facility: CLINIC | Age: 71
End: 2021-01-15
Payer: MEDICARE

## 2021-01-15 ENCOUNTER — APPOINTMENT (OUTPATIENT)
Dept: OTOLARYNGOLOGY | Facility: CLINIC | Age: 71
End: 2021-01-15
Payer: MEDICARE

## 2021-01-15 VITALS
WEIGHT: 149 LBS | DIASTOLIC BLOOD PRESSURE: 82 MMHG | SYSTOLIC BLOOD PRESSURE: 148 MMHG | HEART RATE: 60 BPM | TEMPERATURE: 97.2 F | HEIGHT: 61 IN | BODY MASS INDEX: 28.13 KG/M2

## 2021-01-15 DIAGNOSIS — Z86.79 PERSONAL HISTORY OF OTHER DISEASES OF THE CIRCULATORY SYSTEM: ICD-10-CM

## 2021-01-15 DIAGNOSIS — M89.8X9 OTHER SPECIFIED DISORDERS OF BONE, UNSPECIFIED SITE: ICD-10-CM

## 2021-01-15 DIAGNOSIS — Z86.39 PERSONAL HISTORY OF OTHER ENDOCRINE, NUTRITIONAL AND METABOLIC DISEASE: ICD-10-CM

## 2021-01-15 PROCEDURE — V5299A: CUSTOM | Mod: NC

## 2021-01-15 PROCEDURE — 92557 COMPREHENSIVE HEARING TEST: CPT

## 2021-01-15 PROCEDURE — 99213 OFFICE O/P EST LOW 20 MIN: CPT

## 2021-01-15 PROCEDURE — 92567 TYMPANOMETRY: CPT

## 2021-01-15 NOTE — PHYSICAL EXAM
[Sandoval Test Lateralizes To Right] : tone lateralization to the right [Midline] : trachea is located in midline position [Clear / Open well] : hypopharynx is clear and opens well [Normal] : no rashes [Hearing Loss Right Only] : normal [Hearing Loss Left Only] : normal [de-identified] : minimal cerumen removed via curettage  [FreeTextEntry1] : bony exostosis along the mandible and a little bit on the maxilla, leukoplakia on the cheeks [FreeTextEntry2] : sinuses nontender to percussion

## 2021-01-15 NOTE — HISTORY OF PRESENT ILLNESS
[de-identified] : The patient presents with h/o bilateral SNHL, for which she wears amplification, reflux and dizziness. Pt denies any further dizziness. She has seen Sue twice this year for her hearing aids. Pt states that she still occasionally uses closed captions on the TV despite having the hearing aids in because she can't hear it well.

## 2021-01-15 NOTE — REVIEW OF SYSTEMS
[Ear Pain] : ear pain [Ear Itch] : ear itch [Dizziness] : dizziness [Vertigo] : vertigo [Heartburn] : heartburn [Anxiety] : anxiety [Depression] : depression [FreeTextEntry9] : joint aches

## 2021-01-15 NOTE — CONSULT LETTER
[Dear  ___] : Dear  [unfilled], [Courtesy Letter:] : I had the pleasure of seeing your patient, [unfilled], in my office today. [Please see my note below.] : Please see my note below. [Consult Closing:] : Thank you very much for allowing me to participate in the care of this patient.  If you have any questions, please do not hesitate to contact me. [Sincerely,] : Sincerely, [FreeTextEntry3] : Cristiano Rolle MD FACS

## 2021-01-15 NOTE — ADDENDUM
[FreeTextEntry1] : Documented by Rox Dee acting as scribe for Dr. Rolle on 01/15/2021.\par \par All Medical record entries made by the Scribe were at my, Dr. Rolle, direction and personally dictated by me on 01/15/2021 . I have reviewed the chart and agree that the record accurately reflects my personal performance of the history, physical exam, assessment and plan. I have also personally directed, reviewed, and agreed with the discharge instructions.

## 2021-01-15 NOTE — ASSESSMENT
[FreeTextEntry1] : Reviewed and reconciled medications, allergies, PMHx, PSHx, SocHx, FMHx.\par \par h/o bilateral SNHL, for which she wears amplification, reflux and dizziness - no further dizziness\par nasal valve collapse\par \par Audio 1/3/2020: bilateral across the board SNHL between 30-50 db, right slightly worse than left, right 96% discrimination at 75 db, left 96% discrimination at 70 db\par \par Audio: hearing WNL sloping to a mild HF SNHL, right 100% discrimination at 60 db, left 92% discrimination at 55 db\par \par Plan:\par Cerumen removed. Reviewed prior audio results. Audio - results interpreted by Dr. Rolle and reviewed with the patient. Continue amplification. FU 1 year.

## 2021-05-06 ENCOUNTER — APPOINTMENT (OUTPATIENT)
Dept: PLASTIC SURGERY | Facility: CLINIC | Age: 71
End: 2021-05-06
Payer: MEDICARE

## 2021-05-06 VITALS
OXYGEN SATURATION: 100 % | HEART RATE: 81 BPM | BODY MASS INDEX: 27.56 KG/M2 | WEIGHT: 146 LBS | TEMPERATURE: 97.9 F | DIASTOLIC BLOOD PRESSURE: 90 MMHG | HEIGHT: 61 IN | SYSTOLIC BLOOD PRESSURE: 156 MMHG

## 2021-05-06 PROCEDURE — 99204 OFFICE O/P NEW MOD 45 MIN: CPT

## 2021-06-17 ENCOUNTER — APPOINTMENT (OUTPATIENT)
Dept: PHARMACY | Facility: CLINIC | Age: 71
End: 2021-06-17
Payer: SELF-PAY

## 2021-06-17 PROCEDURE — V5299A: CUSTOM | Mod: NC

## 2021-07-01 ENCOUNTER — APPOINTMENT (OUTPATIENT)
Dept: PLASTIC SURGERY | Facility: CLINIC | Age: 71
End: 2021-07-01
Payer: MEDICARE

## 2021-07-01 VITALS
HEIGHT: 61 IN | DIASTOLIC BLOOD PRESSURE: 90 MMHG | SYSTOLIC BLOOD PRESSURE: 150 MMHG | TEMPERATURE: 97.1 F | HEART RATE: 70 BPM | WEIGHT: 145 LBS | BODY MASS INDEX: 27.38 KG/M2 | OXYGEN SATURATION: 98 %

## 2021-07-01 PROCEDURE — 99212 OFFICE O/P EST SF 10 MIN: CPT

## 2021-07-02 NOTE — HISTORY OF PRESENT ILLNESS
[FreeTextEntry1] : 71 y/o pt is here for panniculectomy/abdominoplasty consultation. pt s/p lap band surgery done approximately 15 years ago. pt weight prior to surgery 260, highest weight gained 301 pounds. pt reports she has lost over 150 pounds, current weight 146 pounds. pt reports hx rash development, treated with prescribed medicated ointment, noticed improvement. pt denies any odor or moisture but reports chaffing along panty line. pt c/o excess skin of abdomen and pubic region.

## 2021-07-02 NOTE — CONSULT LETTER
[Dear  ___] : Dear  [unfilled], [Consult Letter:] : I had the pleasure of evaluating your patient, [unfilled]. [Please see my note below.] : Please see my note below. [Consult Closing:] : Thank you very much for allowing me to participate in the care of this patient.  If you have any questions, please do not hesitate to contact me. [Sincerely,] : Sincerely, [FreeTextEntry3] : Mat Coello MD, FACS

## 2021-07-03 ENCOUNTER — NON-APPOINTMENT (OUTPATIENT)
Age: 71
End: 2021-07-03

## 2021-07-06 ENCOUNTER — OUTPATIENT (OUTPATIENT)
Dept: OUTPATIENT SERVICES | Facility: HOSPITAL | Age: 71
LOS: 1 days | End: 2021-07-06
Payer: MEDICARE

## 2021-07-06 ENCOUNTER — APPOINTMENT (OUTPATIENT)
Dept: BARIATRICS | Facility: CLINIC | Age: 71
End: 2021-07-06
Payer: MEDICARE

## 2021-07-06 ENCOUNTER — APPOINTMENT (OUTPATIENT)
Dept: DISASTER EMERGENCY | Facility: CLINIC | Age: 71
End: 2021-07-06

## 2021-07-06 VITALS
WEIGHT: 147.27 LBS | DIASTOLIC BLOOD PRESSURE: 94 MMHG | HEART RATE: 78 BPM | HEIGHT: 61 IN | BODY MASS INDEX: 27.8 KG/M2 | TEMPERATURE: 96.8 F | OXYGEN SATURATION: 93 % | SYSTOLIC BLOOD PRESSURE: 140 MMHG

## 2021-07-06 DIAGNOSIS — I10 ESSENTIAL (PRIMARY) HYPERTENSION: ICD-10-CM

## 2021-07-06 DIAGNOSIS — Z98.84 BARIATRIC SURGERY STATUS: ICD-10-CM

## 2021-07-06 DIAGNOSIS — Z98.84 BARIATRIC SURGERY STATUS: Chronic | ICD-10-CM

## 2021-07-06 DIAGNOSIS — Z96.643 PRESENCE OF ARTIFICIAL HIP JOINT, BILATERAL: Chronic | ICD-10-CM

## 2021-07-06 DIAGNOSIS — Z90.710 ACQUIRED ABSENCE OF BOTH CERVIX AND UTERUS: Chronic | ICD-10-CM

## 2021-07-06 DIAGNOSIS — E65 LOCALIZED ADIPOSITY: ICD-10-CM

## 2021-07-06 DIAGNOSIS — E66.3 OVERWEIGHT: ICD-10-CM

## 2021-07-06 PROCEDURE — 74220 X-RAY XM ESOPHAGUS 1CNTRST: CPT

## 2021-07-06 PROCEDURE — 43999 UNLISTED PROCEDURE STOMACH: CPT

## 2021-07-06 PROCEDURE — 74220 X-RAY XM ESOPHAGUS 1CNTRST: CPT | Mod: 26

## 2021-07-06 PROCEDURE — 99214 OFFICE O/P EST MOD 30 MIN: CPT | Mod: 25

## 2021-07-06 NOTE — REVIEW OF SYSTEMS
[Recent Change In Weight] : ~T recent weight change [Negative] : Endocrine [Fever] : no fever [Chills] : no chills [Dysphagia] : no dysphagia [Chest Pain] : no chest pain [Palpitations] : no palpitations [Shortness Of Breath] : no shortness of breath [Wheezing] : no wheezing [Abdominal Pain] : no abdominal pain [Vomiting] : no vomiting [Reflux/Heartburn] : no reflux/ heartburn [Joint Pain] : no joint pain [Joint Stiffness] : no joint stiffness [Skin Wound] : no skin wound [FreeTextEntry2] : weight gain   [de-identified] : excess skin - pannus

## 2021-07-06 NOTE — ASSESSMENT
[FreeTextEntry1] : 70 year old F s/p lap band surgery 10 CM- here for follow up visit. Pt was last seen on 11/18/2020. Current weight is 147 lbs. Weight gain  since last visit. Pt here for follow up visit. Pt denies any issues or concerns with lap band. Pt is here to have lap band fully decompressed for upcoming procedure scheduled with Dr. Mat Coello- Panniculectomy / Abdominoplasty on 7/10/2021 . \par \par Video Esophagram performed today - No obstruction No prolapse noted. No pouch dilatation. Unremarkable video esophagram. \par \par Band adjustment performed today.Pt instructed to follow liquid diet for next 24- 48 hours then soft foods and advance to solids as tolerated.LAP BAND WAS EMPTIED TODAY. 1 CC OF FLUID REMOVED FROM LAP BAND. \par \par PST is scheduled at [facility] as per patient. \par \par Nutritional counseling has been provided. The patient is encouraged to remain calorie conscious and continue a low fat, low carbohydrate, protein focus diet. Pt encouraged to participate in a daily exercise regimen incorporating cardio and  strength training. Encouraged to keep a food journal.\par \par Return to office in 3- 6 months or sooner if any issues or concerns. \par \par \par Additional time spent  reviewing chart before and after visit.  \par

## 2021-07-06 NOTE — HISTORY OF PRESENT ILLNESS
[Procedure: ___] : Procedure performed: [unfilled]  [Surgeon Name:   ___] : Surgeon Name: Dr. OLIVAS [Pre-Op Weight ___] : Pre-op weight was [unfilled] lbs [Date of Surgery: ___] : Date of Surgery:   [unfilled] [de-identified] : 70 year old F s/p lap band surgery 10 CM- here for follow up visit. Pt was last seen on 11/18/2020. Current weight is 147 lbs. Weight gain  since last visit. Pt here for follow up visit. Pt denies any issues or concerns with lap band. Pt is here to have lap band fully decompressed for upcoming procedure scheduled with Dr. Mat Coello- Panniculectomy / Abdominoplasty on 7/10/2021 . Pt currently has 1.3 cc of fluid in lap band. Denies any recent trauma. Pt denies any port site discomfort. Pt continue to reports with  discomfort related to excess skin along the abdomen and pelvic region due to weight loss. Pt reports occasional reflux symptoms. Pt continues to work on consuming consistent protein focus meals and consuming a sufficient amount of zero calorie liquid per day.Currently following up with a private nutritionist.  No change in BM. No nausea or vomiting. Patient exercising regularly - incorporating cardio and strength training. Pt denies port site pain. History of  Upper EGD - - 3/27/2019- small sliding hiatal hernia/ no evidence of Vides's . Findings suggestive of delayed gastric emptying .Pathology was negative-  No metaplasia or dysplasia on pathology report .\par \par Video Esophagram performed today - No obstruction No prolapse noted. No pouch dilatation. Unremarkable video esophagram. \par \par

## 2021-07-06 NOTE — PHYSICAL EXAM
[Obese, well nourished, in no acute distress] : obese, well nourished, in no acute distress [Normal] : affect appropriate [de-identified] : EOMI , Anicteric  [de-identified] : obese soft non-tender no evidence of hernia + large pannus.

## 2021-07-07 ENCOUNTER — OUTPATIENT (OUTPATIENT)
Dept: OUTPATIENT SERVICES | Facility: HOSPITAL | Age: 71
LOS: 1 days | End: 2021-07-07
Payer: SELF-PAY

## 2021-07-07 ENCOUNTER — TRANSCRIPTION ENCOUNTER (OUTPATIENT)
Age: 71
End: 2021-07-07

## 2021-07-07 VITALS
DIASTOLIC BLOOD PRESSURE: 80 MMHG | WEIGHT: 145.95 LBS | TEMPERATURE: 98 F | SYSTOLIC BLOOD PRESSURE: 150 MMHG | OXYGEN SATURATION: 98 % | RESPIRATION RATE: 16 BRPM | HEART RATE: 68 BPM | HEIGHT: 61 IN

## 2021-07-07 DIAGNOSIS — Z96.643 PRESENCE OF ARTIFICIAL HIP JOINT, BILATERAL: Chronic | ICD-10-CM

## 2021-07-07 DIAGNOSIS — M79.3 PANNICULITIS, UNSPECIFIED: ICD-10-CM

## 2021-07-07 DIAGNOSIS — E88.1 LIPODYSTROPHY, NOT ELSEWHERE CLASSIFIED: ICD-10-CM

## 2021-07-07 DIAGNOSIS — Z98.84 BARIATRIC SURGERY STATUS: Chronic | ICD-10-CM

## 2021-07-07 DIAGNOSIS — Z98.890 OTHER SPECIFIED POSTPROCEDURAL STATES: Chronic | ICD-10-CM

## 2021-07-07 DIAGNOSIS — Z96.651 PRESENCE OF RIGHT ARTIFICIAL KNEE JOINT: Chronic | ICD-10-CM

## 2021-07-07 DIAGNOSIS — E66.01 MORBID (SEVERE) OBESITY DUE TO EXCESS CALORIES: ICD-10-CM

## 2021-07-07 DIAGNOSIS — Z90.710 ACQUIRED ABSENCE OF BOTH CERVIX AND UTERUS: Chronic | ICD-10-CM

## 2021-07-07 DIAGNOSIS — L30.4 ERYTHEMA INTERTRIGO: ICD-10-CM

## 2021-07-07 DIAGNOSIS — Z01.818 ENCOUNTER FOR OTHER PREPROCEDURAL EXAMINATION: ICD-10-CM

## 2021-07-07 LAB
ANION GAP SERPL CALC-SCNC: 14 MMOL/L — SIGNIFICANT CHANGE UP (ref 5–17)
BUN SERPL-MCNC: 22 MG/DL — SIGNIFICANT CHANGE UP (ref 7–23)
CALCIUM SERPL-MCNC: 10.4 MG/DL — SIGNIFICANT CHANGE UP (ref 8.4–10.5)
CHLORIDE SERPL-SCNC: 101 MMOL/L — SIGNIFICANT CHANGE UP (ref 96–108)
CO2 SERPL-SCNC: 26 MMOL/L — SIGNIFICANT CHANGE UP (ref 22–31)
CREAT SERPL-MCNC: 1.01 MG/DL — SIGNIFICANT CHANGE UP (ref 0.5–1.3)
GLUCOSE SERPL-MCNC: 88 MG/DL — SIGNIFICANT CHANGE UP (ref 70–99)
HCT VFR BLD CALC: 41.1 % — SIGNIFICANT CHANGE UP (ref 34.5–45)
HGB BLD-MCNC: 13.2 G/DL — SIGNIFICANT CHANGE UP (ref 11.5–15.5)
MCHC RBC-ENTMCNC: 28.8 PG — SIGNIFICANT CHANGE UP (ref 27–34)
MCHC RBC-ENTMCNC: 32.1 GM/DL — SIGNIFICANT CHANGE UP (ref 32–36)
MCV RBC AUTO: 89.5 FL — SIGNIFICANT CHANGE UP (ref 80–100)
NRBC # BLD: 0 /100 WBCS — SIGNIFICANT CHANGE UP (ref 0–0)
PLATELET # BLD AUTO: 232 K/UL — SIGNIFICANT CHANGE UP (ref 150–400)
POTASSIUM SERPL-MCNC: 3.9 MMOL/L — SIGNIFICANT CHANGE UP (ref 3.5–5.3)
POTASSIUM SERPL-SCNC: 3.9 MMOL/L — SIGNIFICANT CHANGE UP (ref 3.5–5.3)
RBC # BLD: 4.59 M/UL — SIGNIFICANT CHANGE UP (ref 3.8–5.2)
RBC # FLD: 13.5 % — SIGNIFICANT CHANGE UP (ref 10.3–14.5)
SARS-COV-2 N GENE NPH QL NAA+PROBE: NOT DETECTED
SODIUM SERPL-SCNC: 141 MMOL/L — SIGNIFICANT CHANGE UP (ref 135–145)
WBC # BLD: 4.75 K/UL — SIGNIFICANT CHANGE UP (ref 3.8–10.5)
WBC # FLD AUTO: 4.75 K/UL — SIGNIFICANT CHANGE UP (ref 3.8–10.5)

## 2021-07-07 PROCEDURE — G0463: CPT

## 2021-07-07 PROCEDURE — 80048 BASIC METABOLIC PNL TOTAL CA: CPT

## 2021-07-07 PROCEDURE — 85027 COMPLETE CBC AUTOMATED: CPT

## 2021-07-07 RX ORDER — SODIUM CHLORIDE 9 MG/ML
3 INJECTION INTRAMUSCULAR; INTRAVENOUS; SUBCUTANEOUS EVERY 8 HOURS
Refills: 0 | Status: DISCONTINUED | OUTPATIENT
Start: 2021-07-09 | End: 2021-07-09

## 2021-07-07 RX ORDER — FLUOXETINE HCL 10 MG
80 CAPSULE ORAL
Qty: 0 | Refills: 0 | DISCHARGE

## 2021-07-07 RX ORDER — RANITIDINE HYDROCHLORIDE 150 MG/1
1 TABLET, FILM COATED ORAL
Qty: 0 | Refills: 0 | DISCHARGE

## 2021-07-07 RX ORDER — LIDOCAINE HCL 20 MG/ML
0.2 VIAL (ML) INJECTION ONCE
Refills: 0 | Status: DISCONTINUED | OUTPATIENT
Start: 2021-07-09 | End: 2021-07-09

## 2021-07-07 RX ORDER — CHLORHEXIDINE GLUCONATE 213 G/1000ML
1 SOLUTION TOPICAL ONCE
Refills: 0 | Status: COMPLETED | OUTPATIENT
Start: 2021-07-09 | End: 2021-07-09

## 2021-07-07 RX ORDER — BUPROPION HYDROCHLORIDE 150 MG/1
1 TABLET, EXTENDED RELEASE ORAL
Qty: 0 | Refills: 0 | DISCHARGE

## 2021-07-07 RX ORDER — TOPIRAMATE 25 MG
1 TABLET ORAL
Qty: 0 | Refills: 0 | DISCHARGE

## 2021-07-07 NOTE — H&P PST ADULT - NSICDXPROBLEM_GEN_ALL_CORE_FT
PROBLEM DIAGNOSES  Problem: Morbid obesity  Assessment and Plan:  Panniculectomy / Abdominoplasty on 7/9/2021.  cardiac eval on file  continue on antihypertensive medications   Pre-op education provided - all questions answered. Pt verbalized understanding

## 2021-07-07 NOTE — H&P PST ADULT - HISTORY OF PRESENT ILLNESS
70 year old F with h/o HTN, HLD, OA, Morbid Obesity s/p lap band surgery  (2003 current weight is 147 lbs, lap band fully decompressed for upcoming procedure) Today she presents to PST for scheduled  Panniculectomy / Abdominoplasty on 7/9/2021. Denies any palpitations, SOB, N/V, fever or chills.   *** Moderna series done 4/2021, vaccination card on file

## 2021-07-07 NOTE — H&P PST ADULT - NSICDXPASTSURGICALHX_GEN_ALL_CORE_FT
PAST SURGICAL HISTORY:  H/O bariatric surgery lap band 2003    H/O bilateral hip replacements Left 2004 Right 20019    H/O hernia repair Umbilical 2009    History of hysterectomy 1995    S/P total knee replacement, right 2019

## 2021-07-07 NOTE — H&P PST ADULT - NSICDXPASTMEDICALHX_GEN_ALL_CORE_FT
PAST MEDICAL HISTORY:  Depression     History of hypothyroidism     HLD (hyperlipidemia)     [place] (hard of hearing)     Hypertension     Morbid obesity s/p gastric lap    OA (osteoarthritis)     Wears hearing aid

## 2021-07-08 ENCOUNTER — TRANSCRIPTION ENCOUNTER (OUTPATIENT)
Age: 71
End: 2021-07-08

## 2021-07-08 PROBLEM — E66.01 MORBID (SEVERE) OBESITY DUE TO EXCESS CALORIES: Chronic | Status: ACTIVE | Noted: 2021-07-07

## 2021-07-08 PROBLEM — E78.5 HYPERLIPIDEMIA, UNSPECIFIED: Chronic | Status: ACTIVE | Noted: 2021-07-07

## 2021-07-08 PROBLEM — M19.90 UNSPECIFIED OSTEOARTHRITIS, UNSPECIFIED SITE: Chronic | Status: ACTIVE | Noted: 2021-07-07

## 2021-07-08 PROBLEM — H91.90 UNSPECIFIED HEARING LOSS, UNSPECIFIED EAR: Chronic | Status: ACTIVE | Noted: 2021-07-07

## 2021-07-08 PROBLEM — Z97.4 PRESENCE OF EXTERNAL HEARING-AID: Chronic | Status: ACTIVE | Noted: 2021-07-07

## 2021-07-09 ENCOUNTER — RESULT REVIEW (OUTPATIENT)
Age: 71
End: 2021-07-09

## 2021-07-09 ENCOUNTER — INPATIENT (INPATIENT)
Facility: HOSPITAL | Age: 71
LOS: 1 days | Discharge: HOME | DRG: 908 | End: 2021-07-11
Attending: PLASTIC SURGERY | Admitting: PLASTIC SURGERY
Payer: MEDICARE

## 2021-07-09 ENCOUNTER — APPOINTMENT (OUTPATIENT)
Dept: PLASTIC SURGERY | Facility: HOSPITAL | Age: 71
End: 2021-07-09

## 2021-07-09 ENCOUNTER — APPOINTMENT (OUTPATIENT)
Dept: PLASTIC SURGERY | Facility: HOSPITAL | Age: 71
End: 2021-07-09
Payer: MEDICARE

## 2021-07-09 VITALS
WEIGHT: 145.95 LBS | OXYGEN SATURATION: 100 % | RESPIRATION RATE: 16 BRPM | TEMPERATURE: 99 F | HEIGHT: 61 IN | HEART RATE: 61 BPM | SYSTOLIC BLOOD PRESSURE: 143 MMHG | DIASTOLIC BLOOD PRESSURE: 81 MMHG

## 2021-07-09 DIAGNOSIS — Z90.710 ACQUIRED ABSENCE OF BOTH CERVIX AND UTERUS: Chronic | ICD-10-CM

## 2021-07-09 DIAGNOSIS — Z96.651 PRESENCE OF RIGHT ARTIFICIAL KNEE JOINT: Chronic | ICD-10-CM

## 2021-07-09 DIAGNOSIS — M79.3 PANNICULITIS, UNSPECIFIED: ICD-10-CM

## 2021-07-09 DIAGNOSIS — I97.89 OTHER POSTPROCEDURAL COMPLICATIONS AND DISORDERS OF THE CIRCULATORY SYSTEM, NOT ELSEWHERE CLASSIFIED: ICD-10-CM

## 2021-07-09 DIAGNOSIS — E88.1 LIPODYSTROPHY, NOT ELSEWHERE CLASSIFIED: ICD-10-CM

## 2021-07-09 DIAGNOSIS — Z01.818 ENCOUNTER FOR OTHER PREPROCEDURAL EXAMINATION: ICD-10-CM

## 2021-07-09 DIAGNOSIS — L30.4 ERYTHEMA INTERTRIGO: ICD-10-CM

## 2021-07-09 DIAGNOSIS — Z98.890 OTHER SPECIFIED POSTPROCEDURAL STATES: Chronic | ICD-10-CM

## 2021-07-09 DIAGNOSIS — Z96.643 PRESENCE OF ARTIFICIAL HIP JOINT, BILATERAL: Chronic | ICD-10-CM

## 2021-07-09 DIAGNOSIS — Z98.84 BARIATRIC SURGERY STATUS: Chronic | ICD-10-CM

## 2021-07-09 LAB
ALBUMIN SERPL ELPH-MCNC: 3.3 G/DL — SIGNIFICANT CHANGE UP (ref 3.3–5)
ALP SERPL-CCNC: 52 U/L — SIGNIFICANT CHANGE UP (ref 40–120)
ALT FLD-CCNC: 19 U/L — SIGNIFICANT CHANGE UP (ref 10–45)
ANION GAP SERPL CALC-SCNC: 12 MMOL/L — SIGNIFICANT CHANGE UP (ref 5–17)
ANION GAP SERPL CALC-SCNC: 14 MMOL/L — SIGNIFICANT CHANGE UP (ref 5–17)
APTT BLD: 23.5 SEC — LOW (ref 27.5–35.5)
AST SERPL-CCNC: 20 U/L — SIGNIFICANT CHANGE UP (ref 10–40)
BILIRUB SERPL-MCNC: 0.2 MG/DL — SIGNIFICANT CHANGE UP (ref 0.2–1.2)
BLD GP AB SCN SERPL QL: NEGATIVE — SIGNIFICANT CHANGE UP
BUN SERPL-MCNC: 15 MG/DL — SIGNIFICANT CHANGE UP (ref 7–23)
BUN SERPL-MCNC: 17 MG/DL — SIGNIFICANT CHANGE UP (ref 7–23)
CALCIUM SERPL-MCNC: 6.6 MG/DL — LOW (ref 8.4–10.5)
CALCIUM SERPL-MCNC: 7.8 MG/DL — LOW (ref 8.4–10.5)
CHLORIDE SERPL-SCNC: 106 MMOL/L — SIGNIFICANT CHANGE UP (ref 96–108)
CHLORIDE SERPL-SCNC: 107 MMOL/L — SIGNIFICANT CHANGE UP (ref 96–108)
CO2 SERPL-SCNC: 21 MMOL/L — LOW (ref 22–31)
CO2 SERPL-SCNC: 23 MMOL/L — SIGNIFICANT CHANGE UP (ref 22–31)
CREAT SERPL-MCNC: 0.69 MG/DL — SIGNIFICANT CHANGE UP (ref 0.5–1.3)
CREAT SERPL-MCNC: 0.8 MG/DL — SIGNIFICANT CHANGE UP (ref 0.5–1.3)
GAS PNL BLDA: SIGNIFICANT CHANGE UP
GLUCOSE SERPL-MCNC: 166 MG/DL — HIGH (ref 70–99)
GLUCOSE SERPL-MCNC: 196 MG/DL — HIGH (ref 70–99)
HCT VFR BLD CALC: 28.2 % — LOW (ref 34.5–45)
HCT VFR BLD CALC: 30.9 % — LOW (ref 34.5–45)
HGB BLD-MCNC: 10 G/DL — LOW (ref 11.5–15.5)
HGB BLD-MCNC: 9.1 G/DL — LOW (ref 11.5–15.5)
INR BLD: 1.07 RATIO — SIGNIFICANT CHANGE UP (ref 0.88–1.16)
MCHC RBC-ENTMCNC: 28.9 PG — SIGNIFICANT CHANGE UP (ref 27–34)
MCHC RBC-ENTMCNC: 29.1 PG — SIGNIFICANT CHANGE UP (ref 27–34)
MCHC RBC-ENTMCNC: 32.3 GM/DL — SIGNIFICANT CHANGE UP (ref 32–36)
MCHC RBC-ENTMCNC: 32.4 GM/DL — SIGNIFICANT CHANGE UP (ref 32–36)
MCV RBC AUTO: 89.5 FL — SIGNIFICANT CHANGE UP (ref 80–100)
MCV RBC AUTO: 89.8 FL — SIGNIFICANT CHANGE UP (ref 80–100)
NRBC # BLD: 0 /100 WBCS — SIGNIFICANT CHANGE UP (ref 0–0)
NRBC # BLD: 0 /100 WBCS — SIGNIFICANT CHANGE UP (ref 0–0)
PLATELET # BLD AUTO: 204 K/UL — SIGNIFICANT CHANGE UP (ref 150–400)
PLATELET # BLD AUTO: 205 K/UL — SIGNIFICANT CHANGE UP (ref 150–400)
POTASSIUM SERPL-MCNC: 3.2 MMOL/L — LOW (ref 3.5–5.3)
POTASSIUM SERPL-MCNC: 3.4 MMOL/L — LOW (ref 3.5–5.3)
POTASSIUM SERPL-SCNC: 3.2 MMOL/L — LOW (ref 3.5–5.3)
POTASSIUM SERPL-SCNC: 3.4 MMOL/L — LOW (ref 3.5–5.3)
PROT SERPL-MCNC: 5.4 G/DL — LOW (ref 6–8.3)
PROTHROM AB SERPL-ACNC: 12.8 SEC — SIGNIFICANT CHANGE UP (ref 10.6–13.6)
RBC # BLD: 3.15 M/UL — LOW (ref 3.8–5.2)
RBC # BLD: 3.44 M/UL — LOW (ref 3.8–5.2)
RBC # FLD: 13.5 % — SIGNIFICANT CHANGE UP (ref 10.3–14.5)
RBC # FLD: 13.7 % — SIGNIFICANT CHANGE UP (ref 10.3–14.5)
RH IG SCN BLD-IMP: POSITIVE — SIGNIFICANT CHANGE UP
RH IG SCN BLD-IMP: POSITIVE — SIGNIFICANT CHANGE UP
SODIUM SERPL-SCNC: 141 MMOL/L — SIGNIFICANT CHANGE UP (ref 135–145)
SODIUM SERPL-SCNC: 142 MMOL/L — SIGNIFICANT CHANGE UP (ref 135–145)
WBC # BLD: 11.14 K/UL — HIGH (ref 3.8–10.5)
WBC # BLD: 11.35 K/UL — HIGH (ref 3.8–10.5)
WBC # FLD AUTO: 11.14 K/UL — HIGH (ref 3.8–10.5)
WBC # FLD AUTO: 11.35 K/UL — HIGH (ref 3.8–10.5)

## 2021-07-09 PROCEDURE — 88302 TISSUE EXAM BY PATHOLOGIST: CPT | Mod: 26

## 2021-07-09 PROCEDURE — 10140 I&D HMTMA SEROMA/FLUID COLLJ: CPT

## 2021-07-09 PROCEDURE — 97607 NEG PRS WND THR NDME<=50SQCM: CPT

## 2021-07-09 PROCEDURE — 15830 EXC EXCESSIVE SKIN ABDOMEN: CPT

## 2021-07-09 PROCEDURE — 15847 EXC SKIN ABD ADD-ON: CPT

## 2021-07-09 RX ORDER — BUPROPION HYDROCHLORIDE 150 MG/1
300 TABLET, EXTENDED RELEASE ORAL DAILY
Refills: 0 | Status: DISCONTINUED | OUTPATIENT
Start: 2021-07-09 | End: 2021-07-11

## 2021-07-09 RX ORDER — HYDROMORPHONE HYDROCHLORIDE 2 MG/ML
0.4 INJECTION INTRAMUSCULAR; INTRAVENOUS; SUBCUTANEOUS
Refills: 0 | Status: DISCONTINUED | OUTPATIENT
Start: 2021-07-09 | End: 2021-07-09

## 2021-07-09 RX ORDER — HYDROMORPHONE HYDROCHLORIDE 2 MG/ML
0.5 INJECTION INTRAMUSCULAR; INTRAVENOUS; SUBCUTANEOUS
Refills: 0 | Status: DISCONTINUED | OUTPATIENT
Start: 2021-07-09 | End: 2021-07-09

## 2021-07-09 RX ORDER — LEVOTHYROXINE SODIUM 125 MCG
50 TABLET ORAL DAILY
Refills: 0 | Status: DISCONTINUED | OUTPATIENT
Start: 2021-07-09 | End: 2021-07-11

## 2021-07-09 RX ORDER — PANTOPRAZOLE SODIUM 20 MG/1
40 TABLET, DELAYED RELEASE ORAL
Refills: 0 | Status: DISCONTINUED | OUTPATIENT
Start: 2021-07-09 | End: 2021-07-11

## 2021-07-09 RX ORDER — HYDROMORPHONE HYDROCHLORIDE 2 MG/ML
0.8 INJECTION INTRAMUSCULAR; INTRAVENOUS; SUBCUTANEOUS ONCE
Refills: 0 | Status: DISCONTINUED | OUTPATIENT
Start: 2021-07-09 | End: 2021-07-09

## 2021-07-09 RX ORDER — ONDANSETRON 8 MG/1
4 TABLET, FILM COATED ORAL ONCE
Refills: 0 | Status: DISCONTINUED | OUTPATIENT
Start: 2021-07-09 | End: 2021-07-09

## 2021-07-09 RX ORDER — OXYCODONE HYDROCHLORIDE 5 MG/1
5 TABLET ORAL EVERY 4 HOURS
Refills: 0 | Status: DISCONTINUED | OUTPATIENT
Start: 2021-07-09 | End: 2021-07-11

## 2021-07-09 RX ORDER — ACETAMINOPHEN 500 MG
1000 TABLET ORAL EVERY 6 HOURS
Refills: 0 | Status: COMPLETED | OUTPATIENT
Start: 2021-07-09 | End: 2021-07-10

## 2021-07-09 RX ORDER — OXYCODONE HYDROCHLORIDE 5 MG/1
10 TABLET ORAL EVERY 6 HOURS
Refills: 0 | Status: DISCONTINUED | OUTPATIENT
Start: 2021-07-09 | End: 2021-07-11

## 2021-07-09 RX ORDER — CALCIUM CARBONATE 500(1250)
1 TABLET ORAL EVERY 4 HOURS
Refills: 0 | Status: DISCONTINUED | OUTPATIENT
Start: 2021-07-09 | End: 2021-07-11

## 2021-07-09 RX ORDER — BENZOCAINE AND MENTHOL 5; 1 G/100ML; G/100ML
1 LIQUID ORAL
Refills: 0 | Status: DISCONTINUED | OUTPATIENT
Start: 2021-07-09 | End: 2021-07-11

## 2021-07-09 RX ORDER — METOCLOPRAMIDE HCL 10 MG
10 TABLET ORAL EVERY 8 HOURS
Refills: 0 | Status: DISCONTINUED | OUTPATIENT
Start: 2021-07-09 | End: 2021-07-11

## 2021-07-09 RX ORDER — DULOXETINE HYDROCHLORIDE 30 MG/1
30 CAPSULE, DELAYED RELEASE ORAL DAILY
Refills: 0 | Status: DISCONTINUED | OUTPATIENT
Start: 2021-07-09 | End: 2021-07-11

## 2021-07-09 RX ORDER — ONDANSETRON 8 MG/1
4 TABLET, FILM COATED ORAL EVERY 6 HOURS
Refills: 0 | Status: DISCONTINUED | OUTPATIENT
Start: 2021-07-09 | End: 2021-07-11

## 2021-07-09 RX ORDER — HYDROMORPHONE HYDROCHLORIDE 2 MG/ML
0.25 INJECTION INTRAMUSCULAR; INTRAVENOUS; SUBCUTANEOUS
Refills: 0 | Status: DISCONTINUED | OUTPATIENT
Start: 2021-07-09 | End: 2021-07-09

## 2021-07-09 RX ADMIN — CHLORHEXIDINE GLUCONATE 1 APPLICATION(S): 213 SOLUTION TOPICAL at 11:14

## 2021-07-09 RX ADMIN — Medication 400 MILLIGRAM(S): at 23:47

## 2021-07-09 NOTE — PATIENT PROFILE ADULT - NS PRO AD NO ADVANCE DIRECTIVE
patient states she does have HCP with [facility] but doesn't remember who.     [phone]  - Hang/No

## 2021-07-09 NOTE — PROGRESS NOTE ADULT - SUBJECTIVE AND OBJECTIVE BOX
Patient is s/p panniculectomy/abdominoplasty. In the PACU noted sanguinous drainage from GRACE drains and fullness over the left lower quadrant.  Patient was hypotensive likely due to acute anemia, so decision was made to return to the OR for exploration.    - Discussed findings and plan with patient. We discussed the alternatives, risks and potential complications. All questions were answered. Patient and  consented to proceed.

## 2021-07-09 NOTE — ASU PATIENT PROFILE, ADULT - PMH
Depression    History of hypothyroidism    HLD (hyperlipidemia)    [place] (hard of hearing)    Hypertension    Morbid obesity  s/p gastric lap  OA (osteoarthritis)    Wears hearing aid

## 2021-07-09 NOTE — ASU PATIENT PROFILE, ADULT - PSH
H/O bariatric surgery  lap band 2003  H/O bilateral hip replacements  Left 2004 Right 20019  H/O hernia repair  Umbilical 2009  History of hysterectomy  1995  S/P total knee replacement, right  2019

## 2021-07-09 NOTE — PACU DISCHARGE NOTE - COMMENTS
Patient with postop bleeding and hypotension; initially treated with fluid challenge and ephedrine/phenylephrine boluses; later phenylephrine drip started; awake and alert at all times; states she "feels good:" transferred to OR #5 ([facility]).

## 2021-07-10 ENCOUNTER — TRANSCRIPTION ENCOUNTER (OUTPATIENT)
Age: 71
End: 2021-07-10

## 2021-07-10 DIAGNOSIS — L98.7 EXCESSIVE AND REDUNDANT SKIN AND SUBCUTANEOUS TISSUE: ICD-10-CM

## 2021-07-10 LAB
ANION GAP SERPL CALC-SCNC: 12 MMOL/L — SIGNIFICANT CHANGE UP (ref 5–17)
BUN SERPL-MCNC: 17 MG/DL — SIGNIFICANT CHANGE UP (ref 7–23)
CALCIUM SERPL-MCNC: 7.1 MG/DL — LOW (ref 8.4–10.5)
CHLORIDE SERPL-SCNC: 104 MMOL/L — SIGNIFICANT CHANGE UP (ref 96–108)
CO2 SERPL-SCNC: 23 MMOL/L — SIGNIFICANT CHANGE UP (ref 22–31)
COVID-19 SPIKE DOMAIN AB INTERP: POSITIVE
COVID-19 SPIKE DOMAIN ANTIBODY RESULT: >250 U/ML — HIGH
CREAT SERPL-MCNC: 0.85 MG/DL — SIGNIFICANT CHANGE UP (ref 0.5–1.3)
GLUCOSE SERPL-MCNC: 131 MG/DL — HIGH (ref 70–99)
HCT VFR BLD CALC: 26.6 % — LOW (ref 34.5–45)
HGB BLD-MCNC: 8.8 G/DL — LOW (ref 11.5–15.5)
MAGNESIUM SERPL-MCNC: 2.3 MG/DL — SIGNIFICANT CHANGE UP (ref 1.6–2.6)
MCHC RBC-ENTMCNC: 29.5 PG — SIGNIFICANT CHANGE UP (ref 27–34)
MCHC RBC-ENTMCNC: 33.1 GM/DL — SIGNIFICANT CHANGE UP (ref 32–36)
MCV RBC AUTO: 89.3 FL — SIGNIFICANT CHANGE UP (ref 80–100)
NRBC # BLD: 0 /100 WBCS — SIGNIFICANT CHANGE UP (ref 0–0)
PHOSPHATE SERPL-MCNC: 2.9 MG/DL — SIGNIFICANT CHANGE UP (ref 2.5–4.5)
PLATELET # BLD AUTO: 209 K/UL — SIGNIFICANT CHANGE UP (ref 150–400)
POTASSIUM SERPL-MCNC: 3.1 MMOL/L — LOW (ref 3.5–5.3)
POTASSIUM SERPL-SCNC: 3.1 MMOL/L — LOW (ref 3.5–5.3)
RBC # BLD: 2.98 M/UL — LOW (ref 3.8–5.2)
RBC # FLD: 13.8 % — SIGNIFICANT CHANGE UP (ref 10.3–14.5)
SARS-COV-2 IGG+IGM SERPL QL IA: >250 U/ML — HIGH
SARS-COV-2 IGG+IGM SERPL QL IA: POSITIVE
SODIUM SERPL-SCNC: 139 MMOL/L — SIGNIFICANT CHANGE UP (ref 135–145)
WBC # BLD: 10.58 K/UL — HIGH (ref 3.8–10.5)
WBC # FLD AUTO: 10.58 K/UL — HIGH (ref 3.8–10.5)

## 2021-07-10 RX ORDER — POTASSIUM CHLORIDE 20 MEQ
40 PACKET (EA) ORAL
Refills: 0 | Status: DISCONTINUED | OUTPATIENT
Start: 2021-07-10 | End: 2021-07-10

## 2021-07-10 RX ORDER — UBIDECARENONE 100 MG
1 CAPSULE ORAL
Qty: 0 | Refills: 0 | DISCHARGE

## 2021-07-10 RX ORDER — POLYETHYLENE GLYCOL 3350 17 G/17G
17 POWDER, FOR SOLUTION ORAL DAILY
Refills: 0 | Status: DISCONTINUED | OUTPATIENT
Start: 2021-07-10 | End: 2021-07-11

## 2021-07-10 RX ORDER — POTASSIUM CHLORIDE 20 MEQ
40 PACKET (EA) ORAL ONCE
Refills: 0 | Status: COMPLETED | OUTPATIENT
Start: 2021-07-10 | End: 2021-07-10

## 2021-07-10 RX ORDER — ASCORBIC ACID 60 MG
1 TABLET,CHEWABLE ORAL
Qty: 0 | Refills: 0 | DISCHARGE

## 2021-07-10 RX ADMIN — BENZOCAINE AND MENTHOL 1 LOZENGE: 5; 1 LIQUID ORAL at 11:50

## 2021-07-10 RX ADMIN — Medication 50 MICROGRAM(S): at 05:12

## 2021-07-10 RX ADMIN — DULOXETINE HYDROCHLORIDE 30 MILLIGRAM(S): 30 CAPSULE, DELAYED RELEASE ORAL at 11:32

## 2021-07-10 RX ADMIN — Medication 100 MILLIGRAM(S): at 21:28

## 2021-07-10 RX ADMIN — Medication 1000 MILLIGRAM(S): at 12:30

## 2021-07-10 RX ADMIN — Medication 40 MILLIEQUIVALENT(S): at 18:12

## 2021-07-10 RX ADMIN — Medication 100 MILLIGRAM(S): at 03:15

## 2021-07-10 RX ADMIN — Medication 400 MILLIGRAM(S): at 11:50

## 2021-07-10 RX ADMIN — PANTOPRAZOLE SODIUM 40 MILLIGRAM(S): 20 TABLET, DELAYED RELEASE ORAL at 05:12

## 2021-07-10 RX ADMIN — BUPROPION HYDROCHLORIDE 300 MILLIGRAM(S): 150 TABLET, EXTENDED RELEASE ORAL at 11:49

## 2021-07-10 RX ADMIN — Medication 100 MILLIGRAM(S): at 12:09

## 2021-07-10 RX ADMIN — Medication 40 MILLIEQUIVALENT(S): at 11:32

## 2021-07-10 NOTE — PROGRESS NOTE ADULT - SUBJECTIVE AND OBJECTIVE BOX
Pt doing well. Pain minimal. Urinating overnight, some of which was on herself. Now has a purewick. Not lightheaded or dizzy. Tolerating PO. No nausea or vomiting.     afebrile, VSS  abdomen flat, S/NT/ND, no ecchymosis, prevena vac in place, drains in place, R drain serosanguinous, no clots, L drain minimal  purewick in place    hgb 8.8 from 9.1  BMP pending    69 yo woman POD 1 s/p nish de tegan abdominoplasty s/p take back to OR for bleeding s/p washout, exploration, and ligation of venous bleeders, now doing great.   OOB  diet okay  venodynes, no heparin SQ  f/u BMP  obtain 2 more abdominal binders for patient  dc home today   Pt doing well. Pain minimal. Urinating overnight, some of which was on herself. Now has a purewick. Not lightheaded or dizzy. Tolerating PO. No nausea or vomiting.     afebrile, VSS  abdomen flat, S/NT/ND, no ecchymosis, prevena vac in place, drains in place, R drain serosanguinous, no clots, L drain minimal  purewick in place    hgb 8.8 from 9.1  K 3.1    69 yo woman POD 1 s/p nish de lis abdominoplasty s/p take back to OR for bleeding s/p washout, exploration, and ligation of venous bleeders, now doing great.   OOB  diet okay  pt has some mild hypokalemia which will improve one she starts eating and drinking  venodynes, no heparin SQ  f/u BMP  obtain 2 more abdominal binders for patient  dc home today

## 2021-07-10 NOTE — DISCHARGE NOTE PROVIDER - YES NO FOR MLM POSITIVE OR NEGATIVE COVID RESULT
, Nosebleeds Normal Treatment: I explained this is common when taking isotretinoin. I recommended saline mist in each nostril multiple times a day. If this worsens they will contact us.

## 2021-07-10 NOTE — PROGRESS NOTE ADULT - ATTENDING COMMENTS
Pt seen and examined. Agree with above  Pain under control  Had episodes of dizziness when standing earlier  Denies CP or SOB    Abd - dressing is in place, soft, no fluctuance, shiraz serosang    Plan - POD#1 s/p panniculectomy, abdominoplasty, requiring return to the OR for hematoma.    -Will tx 1 unit PRBC  -OOB with assistance  -DVT prophyl  -Cont binder/prevena vac  -Reg diet  -Possible d/c tomorrrow

## 2021-07-10 NOTE — DISCHARGE NOTE PROVIDER - NSDCMRMEDTOKEN_GEN_ALL_CORE_FT
atorvastatin 10 mg oral tablet: 1 tab(s) orally once a day  buPROPion 150 mg/12 hours (SR) oral tablet, extended release: 1 tab(s) orally once a day  DULoxetine 30 mg oral delayed release capsule: 1 cap(s) orally once a day  hydroCHLOROthiazide 12.5 mg oral capsule: 1 cap(s) orally once a day  levothyroxine 50 mcg (0.05 mg)/mL oral solution: 1 tab(s) orally once a day  Prevacid OTC 15 mg oral delayed release capsule: 1 cap(s) orally once a day (at bedtime)   atorvastatin 10 mg oral tablet: 1 tab(s) orally once a day  buPROPion 150 mg/12 hours (SR) oral tablet, extended release: 1 tab(s) orally once a day  DULoxetine 30 mg oral delayed release capsule: 1 cap(s) orally once a day  hydroCHLOROthiazide 12.5 mg oral capsule: 1 cap(s) orally once a day  levothyroxine 50 mcg (0.05 mg)/mL oral solution: 1 tab(s) orally once a day  polyethylene glycol 3350 oral powder for reconstitution: 17 gram(s) orally once a day, As needed, Constipation  Prevacid OTC 15 mg oral delayed release capsule: 1 cap(s) orally once a day (at bedtime)

## 2021-07-10 NOTE — PROVIDER CONTACT NOTE (OTHER) - ACTION/TREATMENT ORDERED:
MD Maldonado Penaloza notified. ok to lower head of bed of patient - reinforce as needed. continue to monitor. MD Maldonado Penaloza notified. ok to lower head of bed of patient - reinforce as needed. o2 saturation is fine at this time and continue to monitor output on GRACE drains. continue to monitor.

## 2021-07-10 NOTE — DISCHARGE NOTE PROVIDER - CARE PROVIDER_API CALL
Mat Coello (MD)  ColonRectal Surgery; Plastic Surgery; Surgery; Surgery of the Hand  [address] [facility] [facility] [address]  [place] [address]  Phone: [phone]  Fax: [phone]  Follow Up Time:

## 2021-07-10 NOTE — DISCHARGE NOTE PROVIDER - NSDCCPCAREPLAN_GEN_ALL_CORE_FT
PRINCIPAL DISCHARGE DIAGNOSIS  Diagnosis: Excess skin  Assessment and Plan of Treatment: s/p surgery

## 2021-07-10 NOTE — PROVIDER CONTACT NOTE (OTHER) - ASSESSMENT
VSS. pt in bed in NAD. prevena vac continuously beeping indicating leak - reinforced with multiple Tegaderm. prevena vac only beeps when patient is sitting at 30 degrees (as ordered), however, once head of bed is lowered, it stops beeping- indicating there is no longer a leak. leak is positional. assessed sx site with MORAIMA Jarrell to confirm no leakage. VSS. pt in bed in NAD. prevena vac continuously beeping indicating leak - reinforced with multiple Tegaderm. prevena vac only beeps when patient is sitting at 30 degrees (as ordered), however, once head of bed is lowered, it stops beeping- indicating there is no longer a leak. leak is positional. assessed sx site with MORAIMA Jarrell to confirm no leakage.  Patient satting 92-93% - no respiratory distress noted or complaints.   VSS.

## 2021-07-10 NOTE — DISCHARGE NOTE PROVIDER - HOSPITAL COURSE
69 yo woman who had elective nish de lis abdominoplasty yesterday in ambulatory surgery and had blood clots coming from her L GRACE in PACU. Transferred to main OR and explored. Found to have some hematoma, which was evacuated, and two small veins which were bleeding, controlled and closed. Pt is currently doing great. Vitals stable. No pain. urinating well prevena in place and holding suction. She will be discharged with two drains on clindamycin and percocet and will follow up with Dr. Coello as an outpatient. Discussed with Dr. coello. 71 yo woman who had elective nish de lis abdominoplasty yesterday in ambulatory surgery and had blood clots coming from her L GRACE in PACU. Transferred to main OR and explored. Found to have some hematoma, which was evacuated, and two small veins which were bleeding, controlled and closed. Pt is currently doing great. Vitals stable. No pain. urinating well prevena in place and holding suction. She will be discharged with two drains and prevena, on clindamycin and percocet and will follow up with Dr. Coello as an outpatient. Discussed with Dr. coello.

## 2021-07-10 NOTE — DISCHARGE NOTE PROVIDER - NSDCFUADDINST_GEN_ALL_CORE_FT
do not shower or wet the plastic covering your abdomen  take Tylenol for pain, percocet only as needed  do not exceed 4 grams of tylenol in 24 hours  no heavy lifting  walk with back hunched over a little  sleep with head of the bed elevated  empty, measure, and record drain output at the same time everyday; bring this record with you to your follow up appointment  take colace stool softener while on percocet  take clindamycin, as prescribed  follow up with primary care doctor to make sure potassium corrects itself as it was slightly low while in the hospital - 3.1 do not shower or wet the plastic covering your abdomen  take Tylenol for pain, percocet only as needed  do not exceed 4 grams of tylenol in 24 hours  no heavy lifting  walk with back hunched over a little  sleep with head of the bed elevated  empty, measure, and record drain output at the same time everyday; bring this record with you to your follow up appointment  take colace stool softener while on percocet  take clindamycin, as prescribed  follow up with primary care doctor to make sure potassium corrects itself as it was slightly low while in the hospital  do not shower or wet the plastic covering your abdomen  take Tylenol for pain, percocet only as needed  do not exceed 4 grams of tylenol in 24 hours  no heavy lifting  walk with back hunched over a little  every hour, get up and walk for at least 5 minutes  sleep with head of the bed elevated  empty, measure, and record drain output at the same time everyday; bring this record with you to your follow up appointment  take colace stool softener while on percocet  take clindamycin, as prescribed  follow up with primary care doctor to make sure potassium corrects itself as it was slightly low while in the hospital

## 2021-07-11 ENCOUNTER — TRANSCRIPTION ENCOUNTER (OUTPATIENT)
Age: 71
End: 2021-07-11

## 2021-07-11 VITALS
TEMPERATURE: 99 F | DIASTOLIC BLOOD PRESSURE: 76 MMHG | HEART RATE: 76 BPM | OXYGEN SATURATION: 93 % | RESPIRATION RATE: 16 BRPM | SYSTOLIC BLOOD PRESSURE: 146 MMHG

## 2021-07-11 PROCEDURE — 82435 ASSAY OF BLOOD CHLORIDE: CPT

## 2021-07-11 PROCEDURE — 36430 TRANSFUSION BLD/BLD COMPNT: CPT

## 2021-07-11 PROCEDURE — 86850 RBC ANTIBODY SCREEN: CPT

## 2021-07-11 PROCEDURE — 85610 PROTHROMBIN TIME: CPT

## 2021-07-11 PROCEDURE — 86901 BLOOD TYPING SEROLOGIC RH(D): CPT

## 2021-07-11 PROCEDURE — 85018 HEMOGLOBIN: CPT

## 2021-07-11 PROCEDURE — C1889: CPT

## 2021-07-11 PROCEDURE — 88302 TISSUE EXAM BY PATHOLOGIST: CPT

## 2021-07-11 PROCEDURE — 84295 ASSAY OF SERUM SODIUM: CPT

## 2021-07-11 PROCEDURE — 86769 SARS-COV-2 COVID-19 ANTIBODY: CPT

## 2021-07-11 PROCEDURE — 82803 BLOOD GASES ANY COMBINATION: CPT

## 2021-07-11 PROCEDURE — 86923 COMPATIBILITY TEST ELECTRIC: CPT

## 2021-07-11 PROCEDURE — 83605 ASSAY OF LACTIC ACID: CPT

## 2021-07-11 PROCEDURE — 85014 HEMATOCRIT: CPT

## 2021-07-11 PROCEDURE — P9016: CPT

## 2021-07-11 PROCEDURE — 80048 BASIC METABOLIC PNL TOTAL CA: CPT

## 2021-07-11 PROCEDURE — 82947 ASSAY GLUCOSE BLOOD QUANT: CPT

## 2021-07-11 PROCEDURE — 86900 BLOOD TYPING SEROLOGIC ABO: CPT

## 2021-07-11 PROCEDURE — C9399: CPT

## 2021-07-11 PROCEDURE — 82330 ASSAY OF CALCIUM: CPT

## 2021-07-11 PROCEDURE — 85730 THROMBOPLASTIN TIME PARTIAL: CPT

## 2021-07-11 PROCEDURE — 85027 COMPLETE CBC AUTOMATED: CPT

## 2021-07-11 PROCEDURE — 74220 X-RAY XM ESOPHAGUS 1CNTRST: CPT

## 2021-07-11 PROCEDURE — 80053 COMPREHEN METABOLIC PANEL: CPT

## 2021-07-11 PROCEDURE — 84132 ASSAY OF SERUM POTASSIUM: CPT

## 2021-07-11 PROCEDURE — 83735 ASSAY OF MAGNESIUM: CPT

## 2021-07-11 PROCEDURE — 84100 ASSAY OF PHOSPHORUS: CPT

## 2021-07-11 RX ORDER — POLYETHYLENE GLYCOL 3350 17 G/17G
17 POWDER, FOR SOLUTION ORAL
Qty: 0 | Refills: 0 | DISCHARGE
Start: 2021-07-11

## 2021-07-11 RX ADMIN — PANTOPRAZOLE SODIUM 40 MILLIGRAM(S): 20 TABLET, DELAYED RELEASE ORAL at 05:24

## 2021-07-11 RX ADMIN — POLYETHYLENE GLYCOL 3350 17 GRAM(S): 17 POWDER, FOR SOLUTION ORAL at 05:27

## 2021-07-11 RX ADMIN — Medication 50 MICROGRAM(S): at 05:24

## 2021-07-11 RX ADMIN — Medication 100 MILLIGRAM(S): at 05:27

## 2021-07-11 NOTE — DISCHARGE NOTE NURSING/CASE MANAGEMENT/SOCIAL WORK - PATIENT PORTAL LINK FT
You can access the FollowMyHealth Patient Portal offered by [facility] by registering at the following website: [email or web address]. By joining [facility]’s FollowMyHealth portal, you will also be able to view your health information using other applications (apps) compatible with our system.

## 2021-07-13 ENCOUNTER — NON-APPOINTMENT (OUTPATIENT)
Age: 71
End: 2021-07-13

## 2021-07-15 ENCOUNTER — APPOINTMENT (OUTPATIENT)
Dept: PLASTIC SURGERY | Facility: CLINIC | Age: 71
End: 2021-07-15
Payer: MEDICARE

## 2021-07-15 LAB — SURGICAL PATHOLOGY STUDY: SIGNIFICANT CHANGE UP

## 2021-07-15 PROCEDURE — 99024 POSTOP FOLLOW-UP VISIT: CPT

## 2021-07-20 ENCOUNTER — APPOINTMENT (OUTPATIENT)
Dept: PLASTIC SURGERY | Facility: CLINIC | Age: 71
End: 2021-07-20
Payer: MEDICARE

## 2021-07-20 VITALS
SYSTOLIC BLOOD PRESSURE: 131 MMHG | WEIGHT: 139 LBS | TEMPERATURE: 97 F | BODY MASS INDEX: 26.24 KG/M2 | DIASTOLIC BLOOD PRESSURE: 80 MMHG | HEIGHT: 61 IN | OXYGEN SATURATION: 97 % | HEART RATE: 86 BPM

## 2021-07-20 PROCEDURE — 99024 POSTOP FOLLOW-UP VISIT: CPT

## 2021-07-26 ENCOUNTER — APPOINTMENT (OUTPATIENT)
Dept: PLASTIC SURGERY | Facility: CLINIC | Age: 71
End: 2021-07-26
Payer: MEDICARE

## 2021-07-26 PROCEDURE — 99024 POSTOP FOLLOW-UP VISIT: CPT

## 2021-08-03 ENCOUNTER — APPOINTMENT (OUTPATIENT)
Dept: PLASTIC SURGERY | Facility: CLINIC | Age: 71
End: 2021-08-03
Payer: MEDICARE

## 2021-08-03 VITALS
OXYGEN SATURATION: 99 % | TEMPERATURE: 96.1 F | WEIGHT: 139 LBS | HEART RATE: 77 BPM | HEIGHT: 61 IN | SYSTOLIC BLOOD PRESSURE: 130 MMHG | BODY MASS INDEX: 26.24 KG/M2 | DIASTOLIC BLOOD PRESSURE: 85 MMHG

## 2021-08-03 PROCEDURE — 99024 POSTOP FOLLOW-UP VISIT: CPT

## 2021-08-17 ENCOUNTER — APPOINTMENT (OUTPATIENT)
Dept: PLASTIC SURGERY | Facility: CLINIC | Age: 71
End: 2021-08-17
Payer: MEDICARE

## 2021-08-17 VITALS
DIASTOLIC BLOOD PRESSURE: 87 MMHG | WEIGHT: 139 LBS | SYSTOLIC BLOOD PRESSURE: 153 MMHG | BODY MASS INDEX: 26.24 KG/M2 | OXYGEN SATURATION: 92 % | HEART RATE: 68 BPM | TEMPERATURE: 97.9 F | HEIGHT: 61 IN

## 2021-08-17 DIAGNOSIS — M79.3 PANNICULITIS, UNSPECIFIED: ICD-10-CM

## 2021-08-17 DIAGNOSIS — Z01.818 ENCOUNTER FOR OTHER PREPROCEDURAL EXAMINATION: ICD-10-CM

## 2021-08-17 DIAGNOSIS — E65 LOCALIZED ADIPOSITY: ICD-10-CM

## 2021-08-17 DIAGNOSIS — L30.4 ERYTHEMA INTERTRIGO: ICD-10-CM

## 2021-08-17 PROCEDURE — 99024 POSTOP FOLLOW-UP VISIT: CPT

## 2021-09-15 ENCOUNTER — APPOINTMENT (OUTPATIENT)
Dept: PLASTIC SURGERY | Facility: CLINIC | Age: 71
End: 2021-09-15
Payer: MEDICARE

## 2021-09-15 VITALS
HEIGHT: 61 IN | BODY MASS INDEX: 26.06 KG/M2 | DIASTOLIC BLOOD PRESSURE: 92 MMHG | HEART RATE: 85 BPM | SYSTOLIC BLOOD PRESSURE: 156 MMHG | WEIGHT: 138 LBS | OXYGEN SATURATION: 97 %

## 2021-09-15 PROCEDURE — 99024 POSTOP FOLLOW-UP VISIT: CPT

## 2021-09-15 NOTE — REASON FOR VISIT
[Follow-Up: _____] : a [unfilled] follow-up visit [FreeTextEntry1] : s/p panniculectomy abdominoplasty (nish recinos) DOS: 07/09/21.

## 2021-11-12 PROBLEM — M79.3 PANNICULITIS: Status: ACTIVE | Noted: 2021-07-02

## 2021-11-12 PROBLEM — Z01.818 PREOP TESTING: Status: RESOLVED | Noted: 2021-07-05 | Resolved: 2021-11-12

## 2021-11-12 PROBLEM — L30.4 INTERTRIGO: Status: RESOLVED | Noted: 2021-07-02 | Resolved: 2021-11-12

## 2021-11-12 PROBLEM — E65 ABDOMINAL PANNUS: Status: ACTIVE | Noted: 2021-07-02

## 2021-12-18 ENCOUNTER — APPOINTMENT (OUTPATIENT)
Dept: PLASTIC SURGERY | Facility: CLINIC | Age: 71
End: 2021-12-18
Payer: MEDICARE

## 2021-12-18 VITALS
BODY MASS INDEX: 26.06 KG/M2 | WEIGHT: 138 LBS | OXYGEN SATURATION: 98 % | HEIGHT: 61 IN | TEMPERATURE: 98.4 F | DIASTOLIC BLOOD PRESSURE: 91 MMHG | SYSTOLIC BLOOD PRESSURE: 156 MMHG | HEART RATE: 70 BPM

## 2021-12-18 PROCEDURE — 99213 OFFICE O/P EST LOW 20 MIN: CPT

## 2022-01-20 ENCOUNTER — APPOINTMENT (OUTPATIENT)
Dept: PHARMACY | Facility: CLINIC | Age: 72
End: 2022-01-20
Payer: SELF-PAY

## 2022-01-20 PROCEDURE — V5299A: CUSTOM | Mod: NC

## 2022-01-21 ENCOUNTER — OUTPATIENT (OUTPATIENT)
Dept: OUTPATIENT SERVICES | Facility: HOSPITAL | Age: 72
LOS: 1 days | End: 2022-01-21
Payer: MEDICARE

## 2022-01-21 VITALS
HEIGHT: 61 IN | RESPIRATION RATE: 20 BRPM | OXYGEN SATURATION: 97 % | TEMPERATURE: 97 F | DIASTOLIC BLOOD PRESSURE: 90 MMHG | SYSTOLIC BLOOD PRESSURE: 150 MMHG | WEIGHT: 139.99 LBS | HEART RATE: 72 BPM

## 2022-01-21 DIAGNOSIS — E03.9 HYPOTHYROIDISM, UNSPECIFIED: ICD-10-CM

## 2022-01-21 DIAGNOSIS — E88.1 LIPODYSTROPHY, NOT ELSEWHERE CLASSIFIED: ICD-10-CM

## 2022-01-21 DIAGNOSIS — Z98.890 OTHER SPECIFIED POSTPROCEDURAL STATES: Chronic | ICD-10-CM

## 2022-01-21 DIAGNOSIS — G47.33 OBSTRUCTIVE SLEEP APNEA (ADULT) (PEDIATRIC): ICD-10-CM

## 2022-01-21 DIAGNOSIS — F32.A DEPRESSION, UNSPECIFIED: ICD-10-CM

## 2022-01-21 DIAGNOSIS — Z96.643 PRESENCE OF ARTIFICIAL HIP JOINT, BILATERAL: Chronic | ICD-10-CM

## 2022-01-21 DIAGNOSIS — Z98.84 BARIATRIC SURGERY STATUS: Chronic | ICD-10-CM

## 2022-01-21 DIAGNOSIS — Z90.710 ACQUIRED ABSENCE OF BOTH CERVIX AND UTERUS: Chronic | ICD-10-CM

## 2022-01-21 DIAGNOSIS — I10 ESSENTIAL (PRIMARY) HYPERTENSION: ICD-10-CM

## 2022-01-21 DIAGNOSIS — Z96.651 PRESENCE OF RIGHT ARTIFICIAL KNEE JOINT: Chronic | ICD-10-CM

## 2022-01-21 DIAGNOSIS — E78.5 HYPERLIPIDEMIA, UNSPECIFIED: ICD-10-CM

## 2022-01-21 LAB
ANION GAP SERPL CALC-SCNC: 14 MMOL/L — SIGNIFICANT CHANGE UP (ref 7–14)
BUN SERPL-MCNC: 24 MG/DL — HIGH (ref 7–23)
CALCIUM SERPL-MCNC: 9.9 MG/DL — SIGNIFICANT CHANGE UP (ref 8.4–10.5)
CHLORIDE SERPL-SCNC: 103 MMOL/L — SIGNIFICANT CHANGE UP (ref 98–107)
CO2 SERPL-SCNC: 24 MMOL/L — SIGNIFICANT CHANGE UP (ref 22–31)
CREAT SERPL-MCNC: 1.34 MG/DL — HIGH (ref 0.5–1.3)
GLUCOSE SERPL-MCNC: 81 MG/DL — SIGNIFICANT CHANGE UP (ref 70–99)
HCT VFR BLD CALC: 38.3 % — SIGNIFICANT CHANGE UP (ref 34.5–45)
HGB BLD-MCNC: 12.7 G/DL — SIGNIFICANT CHANGE UP (ref 11.5–15.5)
MCHC RBC-ENTMCNC: 29.5 PG — SIGNIFICANT CHANGE UP (ref 27–34)
MCHC RBC-ENTMCNC: 33.2 GM/DL — SIGNIFICANT CHANGE UP (ref 32–36)
MCV RBC AUTO: 88.9 FL — SIGNIFICANT CHANGE UP (ref 80–100)
NRBC # BLD: 0 /100 WBCS — SIGNIFICANT CHANGE UP
NRBC # FLD: 0 K/UL — SIGNIFICANT CHANGE UP
PLATELET # BLD AUTO: 251 K/UL — SIGNIFICANT CHANGE UP (ref 150–400)
POTASSIUM SERPL-MCNC: 3.8 MMOL/L — SIGNIFICANT CHANGE UP (ref 3.5–5.3)
POTASSIUM SERPL-SCNC: 3.8 MMOL/L — SIGNIFICANT CHANGE UP (ref 3.5–5.3)
RBC # BLD: 4.31 M/UL — SIGNIFICANT CHANGE UP (ref 3.8–5.2)
RBC # FLD: 14.4 % — SIGNIFICANT CHANGE UP (ref 10.3–14.5)
SODIUM SERPL-SCNC: 141 MMOL/L — SIGNIFICANT CHANGE UP (ref 135–145)
WBC # BLD: 5.96 K/UL — SIGNIFICANT CHANGE UP (ref 3.8–10.5)
WBC # FLD AUTO: 5.96 K/UL — SIGNIFICANT CHANGE UP (ref 3.8–10.5)

## 2022-01-21 PROCEDURE — 93010 ELECTROCARDIOGRAM REPORT: CPT

## 2022-01-21 RX ORDER — LANSOPRAZOLE 15 MG/1
1 CAPSULE, DELAYED RELEASE ORAL
Qty: 0 | Refills: 0 | DISCHARGE

## 2022-01-21 RX ORDER — DULOXETINE HYDROCHLORIDE 30 MG/1
1 CAPSULE, DELAYED RELEASE ORAL
Qty: 0 | Refills: 0 | DISCHARGE

## 2022-01-21 NOTE — H&P PST ADULT - ASSESSMENT
70 yo female scheduled revision of abdominal scar, liposuction of abdomen, flanks, back with Dr. Coello.

## 2022-01-21 NOTE — H&P PST ADULT - BSA (M2)
Patient was here for a rapid 5. Patient had ppd placement in left arm. Patient was instructed to return for reading on 01- after 3:25pm or 01- before 3:25pm. Patient understand when to return for reading. Patient had a flu shot to the left deltoid no reaction time of flu shot.  
1.62

## 2022-01-21 NOTE — H&P PST ADULT - PROBLEM SELECTOR PLAN 1
-Scheduled revision of abdominal scar, liposuction of abdomen, flanks, back with Dr. Coello on 1/31/2022.  -Verbal and written pre-op instructions provided to patient. Patient verbalized understanding and will call surgeons office for revised instructions if surgery is rescheduled.   Pepcid for GI prophylaxis.   patient given verbal and written instruction with teach back on chlorhexidine shampoo, and the patient verbalized understanding with return demonstration.   Patient aware of need for COVID testing prior to  procedure at a [facility]  and advised to coordinate with surgeon to get tested within 72 hours of procedure.

## 2022-01-21 NOTE — H&P PST ADULT - PROBLEM SELECTOR PLAN 6
Pt. instructed to continue medications as prescribed.   Pending Cardiac eval as per surgeon request-copy requested by PST NP for elevated BP in PST

## 2022-01-21 NOTE — H&P PST ADULT - NSICDXPASTSURGICALHX_GEN_ALL_CORE_FT
PAST SURGICAL HISTORY:  H/O bariatric surgery lap band 2003    H/O bilateral hip replacements Left 2004 Right 20019    H/O hernia repair Umbilical 2009    History of abdominoplasty Panniculectomy    History of hysterectomy 1995    S/P total knee replacement, right 2019

## 2022-01-21 NOTE — H&P PST ADULT - HISTORY OF PRESENT ILLNESS
71 year old F with h/o HTN, HLD, OA, Morbid Obesity s/p lap band surgery  (2003 current weight is 147 lbs, lap band fully decompressed for upcoming procedure) Today she presents to PST for scheduled revision of abdominal scar, liposuction of abdomen, flanks, back with Dr. Coello.

## 2022-01-21 NOTE — H&P PST ADULT - NSICDXPASTMEDICALHX_GEN_ALL_CORE_FT
PAST MEDICAL HISTORY:  Depression     History of hypothyroidism     HLD (hyperlipidemia)     [place] (hard of hearing)     Hypertension     Morbid obesity s/p gastric lap    OA (osteoarthritis)     Wears hearing aid

## 2022-01-27 ENCOUNTER — APPOINTMENT (OUTPATIENT)
Dept: PLASTIC SURGERY | Facility: CLINIC | Age: 72
End: 2022-01-27
Payer: MEDICARE

## 2022-01-27 VITALS
OXYGEN SATURATION: 96 % | HEART RATE: 73 BPM | SYSTOLIC BLOOD PRESSURE: 149 MMHG | BODY MASS INDEX: 26.06 KG/M2 | WEIGHT: 138 LBS | HEIGHT: 61 IN | DIASTOLIC BLOOD PRESSURE: 96 MMHG | TEMPERATURE: 98.7 F

## 2022-01-27 DIAGNOSIS — Z98.890 OTHER SPECIFIED POSTPROCEDURAL STATES: ICD-10-CM

## 2022-01-27 DIAGNOSIS — L91.0 HYPERTROPHIC SCAR: ICD-10-CM

## 2022-01-27 PROCEDURE — 99212 OFFICE O/P EST SF 10 MIN: CPT

## 2022-01-28 VITALS
TEMPERATURE: 98 F | SYSTOLIC BLOOD PRESSURE: 156 MMHG | RESPIRATION RATE: 18 BRPM | HEART RATE: 69 BPM | DIASTOLIC BLOOD PRESSURE: 83 MMHG | OXYGEN SATURATION: 97 % | HEIGHT: 61 IN | WEIGHT: 139.99 LBS

## 2022-01-28 NOTE — ASU PREOPERATIVE ASSESSMENT, ADULT (IPARK ONLY) - TEACHING/LEARNING FACTORS INFLUENCE READINESS TO LEARN
Detail Level: Detailed Quality 226: Preventive Care And Screening: Tobacco Use: Screening And Cessation Intervention: Patient screened for tobacco use, is a smoker AND did not received Cessation Counseling for Unknown Reasons Quality 130: Documentation Of Current Medications In The Medical Record: Current Medications Documented Quality 110: Preventive Care And Screening: Influenza Immunization: Influenza Immunization Administered during Influenza season none

## 2022-01-28 NOTE — ASU PREOPERATIVE ASSESSMENT, ADULT (IPARK ONLY) - FALL HARM RISK - UNIVERSAL INTERVENTIONS
Bed in lowest position, wheels locked, appropriate side rails in place/Call bell, personal items and telephone in reach/Instruct patient to call for assistance before getting out of bed or chair/Non-slip footwear when patient is out of bed/[place] to call system/Physically safe environment - no spills, clutter or unnecessary equipment/Purposeful Proactive Rounding/Room/bathroom lighting operational, light cord in reach

## 2022-01-31 ENCOUNTER — APPOINTMENT (OUTPATIENT)
Dept: PLASTIC SURGERY | Facility: HOSPITAL | Age: 72
End: 2022-01-31
Payer: MEDICARE

## 2022-01-31 ENCOUNTER — OUTPATIENT (OUTPATIENT)
Dept: OUTPATIENT SERVICES | Facility: HOSPITAL | Age: 72
LOS: 1 days | Discharge: HOME | End: 2022-01-31
Payer: MEDICARE

## 2022-01-31 VITALS
DIASTOLIC BLOOD PRESSURE: 84 MMHG | RESPIRATION RATE: 14 BRPM | SYSTOLIC BLOOD PRESSURE: 132 MMHG | HEART RATE: 75 BPM | OXYGEN SATURATION: 97 %

## 2022-01-31 DIAGNOSIS — E88.1 LIPODYSTROPHY, NOT ELSEWHERE CLASSIFIED: ICD-10-CM

## 2022-01-31 DIAGNOSIS — Z98.84 BARIATRIC SURGERY STATUS: Chronic | ICD-10-CM

## 2022-01-31 DIAGNOSIS — Z96.643 PRESENCE OF ARTIFICIAL HIP JOINT, BILATERAL: Chronic | ICD-10-CM

## 2022-01-31 DIAGNOSIS — Z96.651 PRESENCE OF RIGHT ARTIFICIAL KNEE JOINT: Chronic | ICD-10-CM

## 2022-01-31 DIAGNOSIS — Z90.710 ACQUIRED ABSENCE OF BOTH CERVIX AND UTERUS: Chronic | ICD-10-CM

## 2022-01-31 DIAGNOSIS — Z98.890 OTHER SPECIFIED POSTPROCEDURAL STATES: Chronic | ICD-10-CM

## 2022-01-31 PROCEDURE — 14301 TIS TRNFR ANY 30.1-60 SQ CM: CPT

## 2022-01-31 PROCEDURE — 15877 SUCTION LIPECTOMY TRUNK: CPT

## 2022-01-31 PROCEDURE — 15002 WOUND PREP TRK/ARM/LEG: CPT

## 2022-01-31 PROCEDURE — 14302 TIS TRNFR ADDL 30 SQ CM: CPT

## 2022-01-31 RX ORDER — DULOXETINE HYDROCHLORIDE 30 MG/1
1 CAPSULE, DELAYED RELEASE ORAL
Qty: 0 | Refills: 0 | DISCHARGE

## 2022-01-31 RX ORDER — POLYETHYLENE GLYCOL 3350 17 G/17G
0 POWDER, FOR SOLUTION ORAL
Qty: 0 | Refills: 0 | DISCHARGE

## 2022-01-31 RX ORDER — BUPROPION HYDROCHLORIDE 150 MG/1
1 TABLET, EXTENDED RELEASE ORAL
Qty: 0 | Refills: 0 | DISCHARGE

## 2022-01-31 RX ORDER — ATORVASTATIN CALCIUM 80 MG/1
1 TABLET, FILM COATED ORAL
Qty: 0 | Refills: 0 | DISCHARGE

## 2022-01-31 RX ORDER — FAMOTIDINE 10 MG/ML
1 INJECTION INTRAVENOUS
Qty: 0 | Refills: 0 | DISCHARGE

## 2022-01-31 RX ORDER — LEVOTHYROXINE SODIUM 125 MCG
1 TABLET ORAL
Qty: 0 | Refills: 0 | DISCHARGE

## 2022-01-31 RX ORDER — DOCUSATE SODIUM 100 MG
0 CAPSULE ORAL
Qty: 0 | Refills: 0 | DISCHARGE

## 2022-01-31 RX ORDER — UBIDECARENONE 100 MG
200 CAPSULE ORAL
Qty: 0 | Refills: 0 | DISCHARGE

## 2022-01-31 RX ORDER — GLUCOSAMINE/MSM/CHONDROITIN A 750-375MG
2 TABLET ORAL
Qty: 0 | Refills: 0 | DISCHARGE

## 2022-01-31 NOTE — ASU DISCHARGE PLAN (ADULT/PEDIATRIC) - C. MAKE IMPORTANT PERSONAL OR BUSINESS DECISIONS
[facility]    Chief Complaint   Patient presents with     Nursing Home Acute       HPI:    Betsy Resendiz is a 52 year old  (1966), who is being seen today for an episodic care visit at [facility].    HPI information obtained from: facility staff and patient report. Today's concern is:  Acute bacterial conjunctivitis of left eye     Nsg requested a visit today.   Patient's left eye is red.   Per patient eye has drainage and crust. Minimally painful. Does not itch. No vision changes    REVIEW OF SYSTEMS:  4 point ROS including Respiratory, CV, GI and , other than that noted in the HPI,  is negative    /70  Pulse 74  Temp 97.5  F (36.4  C)  Resp 18  SpO2 97%  GENERAL APPEARANCE:  Alert, in no distress  Eye: Left conjunctiva erythematous with yellow crust, scleral injection    ASSESSMENT/PLAN:  Acute bacterial conjunctivitis of left eye   See orders below. Nsg to update with concerns.     Orders:  1.  Polymyxin b/trimethoptim one drop to right eye QID x7 days.  conjuntivitis      Electronically signed by:  DELL Queen CNP   Statement Selected

## 2022-01-31 NOTE — ASU DISCHARGE PLAN (ADULT/PEDIATRIC) - CARE PROVIDER_API CALL
Mat Coello (MD)  ColonRectal Surgery; Plastic Surgery; Surgery; Surgery of the Hand  [address], [address]  [place] [address]  Phone: [phone]  Fax: [phone]  Follow Up Time: 1 week

## 2022-01-31 NOTE — ASU DISCHARGE PLAN (ADULT/PEDIATRIC) - ASU DC SPECIAL INSTRUCTIONSFT
Please continue to wear the abdominal binders at all times and keep the surgical site dry.    For pain control please take tylenol and ibuprofen as needed.     Please follow up with Dr. Coello within x1 week after discharge from the hospital. You may call [phone] to schedule an appointment.

## 2022-01-31 NOTE — ASU DISCHARGE PLAN (ADULT/PEDIATRIC) - NS MD DC FALL RISK RISK
For information on Fall & Injury Prevention, visit: [email or web address] OR  [email or web address] OR  https://www.cdc.gov/steadi/patient.html

## 2022-01-31 NOTE — BRIEF OPERATIVE NOTE - OPERATION/FINDINGS
Liposuction of back, flanks and abdomen.  Scar revision of lower abdominal scar.  1.9L of lipoaspirate.

## 2022-02-08 ENCOUNTER — APPOINTMENT (OUTPATIENT)
Dept: PLASTIC SURGERY | Facility: CLINIC | Age: 72
End: 2022-02-08
Payer: MEDICARE

## 2022-02-08 VITALS
WEIGHT: 138 LBS | OXYGEN SATURATION: 99 % | TEMPERATURE: 98.4 F | BODY MASS INDEX: 26.06 KG/M2 | HEIGHT: 61 IN | HEART RATE: 80 BPM | DIASTOLIC BLOOD PRESSURE: 90 MMHG | SYSTOLIC BLOOD PRESSURE: 150 MMHG

## 2022-02-08 DIAGNOSIS — R21 RASH AND OTHER NONSPECIFIC SKIN ERUPTION: ICD-10-CM

## 2022-02-08 PROBLEM — Z98.890 S/P PANNICULECTOMY: Status: ACTIVE | Noted: 2021-12-20

## 2022-02-08 PROBLEM — L91.0 HYPERTROPHIC SCAR: Status: ACTIVE | Noted: 2021-12-20

## 2022-02-08 PROCEDURE — 99024 POSTOP FOLLOW-UP VISIT: CPT

## 2022-02-15 ENCOUNTER — APPOINTMENT (OUTPATIENT)
Dept: PLASTIC SURGERY | Facility: CLINIC | Age: 72
End: 2022-02-15
Payer: MEDICARE

## 2022-02-15 VITALS
TEMPERATURE: 97.9 F | SYSTOLIC BLOOD PRESSURE: 158 MMHG | DIASTOLIC BLOOD PRESSURE: 89 MMHG | OXYGEN SATURATION: 99 % | HEIGHT: 61 IN | WEIGHT: 138 LBS | BODY MASS INDEX: 26.06 KG/M2 | HEART RATE: 78 BPM

## 2022-02-15 PROCEDURE — 99024 POSTOP FOLLOW-UP VISIT: CPT

## 2022-03-21 ENCOUNTER — APPOINTMENT (OUTPATIENT)
Dept: PLASTIC SURGERY | Facility: CLINIC | Age: 72
End: 2022-03-21
Payer: MEDICARE

## 2022-03-21 VITALS
BODY MASS INDEX: 26.24 KG/M2 | SYSTOLIC BLOOD PRESSURE: 154 MMHG | WEIGHT: 139 LBS | HEART RATE: 80 BPM | HEIGHT: 61 IN | DIASTOLIC BLOOD PRESSURE: 98 MMHG | TEMPERATURE: 97.6 F | OXYGEN SATURATION: 97 %

## 2022-03-21 DIAGNOSIS — Z98.890 OTHER SPECIFIED POSTPROCEDURAL STATES: ICD-10-CM

## 2022-03-21 DIAGNOSIS — E88.1 LIPODYSTROPHY, NOT ELSEWHERE CLASSIFIED: ICD-10-CM

## 2022-03-21 PROCEDURE — 99024 POSTOP FOLLOW-UP VISIT: CPT

## 2022-03-25 PROBLEM — Z98.890 POST-OPERATIVE STATE: Status: ACTIVE | Noted: 2021-07-17

## 2022-03-25 PROBLEM — E88.1 LIPODYSTROPHY: Status: ACTIVE | Noted: 2021-07-02

## 2022-03-25 PROBLEM — Z98.890 S/P SCAR REVISION: Status: ACTIVE | Noted: 2022-02-15

## 2022-04-19 ENCOUNTER — APPOINTMENT (OUTPATIENT)
Dept: BARIATRICS | Facility: CLINIC | Age: 72
End: 2022-04-19
Payer: MEDICARE

## 2022-04-19 VITALS
WEIGHT: 143.3 LBS | OXYGEN SATURATION: 98 % | HEART RATE: 73 BPM | BODY MASS INDEX: 27.06 KG/M2 | HEIGHT: 61 IN | DIASTOLIC BLOOD PRESSURE: 82 MMHG | SYSTOLIC BLOOD PRESSURE: 146 MMHG | TEMPERATURE: 96.7 F

## 2022-04-19 PROCEDURE — 99214 OFFICE O/P EST MOD 30 MIN: CPT | Mod: 25

## 2022-04-19 PROCEDURE — 43999 UNLISTED PROCEDURE STOMACH: CPT

## 2022-04-19 RX ORDER — OMEPRAZOLE 40 MG/1
40 CAPSULE, DELAYED RELEASE ORAL
Qty: 90 | Refills: 1 | Status: DISCONTINUED | COMMUNITY
Start: 2020-12-17 | End: 2022-04-19

## 2022-04-19 RX ORDER — LANSOPRAZOLE 15 MG/1
15 CAPSULE, DELAYED RELEASE ORAL
Refills: 0 | Status: DISCONTINUED | COMMUNITY
Start: 2021-01-15 | End: 2022-04-19

## 2022-04-19 RX ORDER — DENOSUMAB 60 MG/ML
60 INJECTION SUBCUTANEOUS
Refills: 0 | Status: ACTIVE | COMMUNITY

## 2022-04-19 RX ORDER — NYSTATIN AND TRIAMCINOLONE ACETONIDE 100000; 1 MG/G; MG/G
100000-0.1 CREAM TOPICAL TWICE DAILY
Qty: 1 | Refills: 1 | Status: DISCONTINUED | COMMUNITY
Start: 2021-07-20 | End: 2022-04-19

## 2022-04-19 RX ORDER — CLINDAMYCIN HYDROCHLORIDE 300 MG/1
300 CAPSULE ORAL 3 TIMES DAILY
Qty: 18 | Refills: 0 | Status: DISCONTINUED | COMMUNITY
Start: 2021-07-20 | End: 2022-04-19

## 2022-04-19 RX ORDER — TRIAMCINOLONE ACETONIDE 1 MG/G
0.1 CREAM TOPICAL
Qty: 30 | Refills: 0 | Status: DISCONTINUED | COMMUNITY
Start: 2021-06-16 | End: 2022-04-19

## 2022-04-19 RX ORDER — TRIAMCINOLONE ACETONIDE 1 MG/G
0.1 OINTMENT TOPICAL TWICE DAILY
Qty: 1 | Refills: 2 | Status: DISCONTINUED | COMMUNITY
Start: 2022-02-08 | End: 2022-04-19

## 2022-04-21 NOTE — HISTORY OF PRESENT ILLNESS
[Procedure: ___] : Procedure performed: [unfilled]  [Date of Surgery: ___] : Date of Surgery:   [unfilled] [Surgeon Name:   ___] : Surgeon Name: Dr. OLIVAS [Pre-Op Weight ___] : Pre-op weight was [unfilled] lbs [___ Years Post Op] : [unfilled] years [de-identified] : 71 year old F is 18 years out from Lap band placement. Pt presents to the office today for band to be filled as concerned for 3 lb weight gain. Current weight 143 lbs, weight stable since last visit on 7/6/2021. Pt reports past month binge/emotional eating, consuming ice cream, popcorn, jelly beans, dry cereal. Band was emptied on 7/6/2021 (1.3cc) as pt was having abdominoplasty procedure then. Otherwise pt is consuming an adequate amount of protein each meal, consuming 3 meals/day and 2 snacks/day (fruits or cereal). Pt is tolerating textured and solid foods without any issues. Drinking adequate zero calorie liquid per day, averaging 60oz/day. Reports BM once every day. Pt is taking vitamin supplements daily. Pt is exercising - juice 4 times/wk. Pt is currently sleeping ~ 6-8 hours/night. No reflux, nausea, vomiting, constipation or diarrhea, fever, chills or sweats.\par

## 2022-04-21 NOTE — ASSESSMENT
[FreeTextEntry1] : 71 year old F is 18 years out from Lap band placement. Pt presents to the office today for band to be filled as concerned for 3 lb weight gain. Current weight 143 lbs, weight stable since last visit on 7/6/2021.\par Otherwise doing well\par \par Counseled about nutrition guidelines\par Routine post adjustment nutritional counseling was provided. This patient should adhere to a liquid diet for the next 24- 48 hours advancing one stage per day to regular as tolerated.\par Encouraged better food choices - maintain food log\par Zero calorie liquid intake 64 oz per day \par Encouraged to participate in a daily exercise regimen incorporating cardio and strength training\par Track steps - goal approximately 8-10k steps per day\par Discussed with patient to call the office if any symptoms of over restriction, reflux or other concerns \par \par \par Return to office in 1 month\par All questions answered\par \par Pt seen with Dr. Rodriguez\par \par Additional time spent before and after visit reviewing chart

## 2022-04-21 NOTE — PHYSICAL EXAM
[Normal] : affect appropriate [de-identified] : Eyeglasses [de-identified] : soft, NT, ND, normoactive bowel sounds, no hepatosplenomegaly or masses appreciated\par incisions well healed, no drainage or bleeding, port palpable without port site tenderness

## 2022-04-21 NOTE — PROCEDURE
[FreeTextEntry1] : BAND adjustment was performed today. Using standard sterile technique, a 20 - gauge Almaraz needle was inserted into the LAP- BAND port.\par \par The patient was seated erect and tolerated water test without any symptoms of reflux, dysphagia or regurgitation.\par \par Under sterile conditions, the port was accessed without complication using manual guidance and non-coring needle. On access, fluid was found/measured. Adjustment was performed without complication. Amount was aspirated again and instilled to confirm placement.

## 2022-05-18 ENCOUNTER — OUTPATIENT (OUTPATIENT)
Dept: OUTPATIENT SERVICES | Facility: HOSPITAL | Age: 72
LOS: 1 days | End: 2022-05-18
Payer: MEDICARE

## 2022-05-18 ENCOUNTER — APPOINTMENT (OUTPATIENT)
Dept: BARIATRICS | Facility: CLINIC | Age: 72
End: 2022-05-18
Payer: MEDICARE

## 2022-05-18 VITALS
HEIGHT: 61 IN | TEMPERATURE: 96.8 F | BODY MASS INDEX: 27.1 KG/M2 | WEIGHT: 143.52 LBS | SYSTOLIC BLOOD PRESSURE: 130 MMHG | DIASTOLIC BLOOD PRESSURE: 90 MMHG | OXYGEN SATURATION: 98 % | HEART RATE: 75 BPM

## 2022-05-18 DIAGNOSIS — Z98.84 BARIATRIC SURGERY STATUS: ICD-10-CM

## 2022-05-18 DIAGNOSIS — Z96.651 PRESENCE OF RIGHT ARTIFICIAL KNEE JOINT: Chronic | ICD-10-CM

## 2022-05-18 DIAGNOSIS — Z98.890 OTHER SPECIFIED POSTPROCEDURAL STATES: Chronic | ICD-10-CM

## 2022-05-18 DIAGNOSIS — Z90.710 ACQUIRED ABSENCE OF BOTH CERVIX AND UTERUS: Chronic | ICD-10-CM

## 2022-05-18 DIAGNOSIS — Z96.643 PRESENCE OF ARTIFICIAL HIP JOINT, BILATERAL: Chronic | ICD-10-CM

## 2022-05-18 DIAGNOSIS — Z98.84 BARIATRIC SURGERY STATUS: Chronic | ICD-10-CM

## 2022-05-18 PROCEDURE — 43999 UNLISTED PROCEDURE STOMACH: CPT

## 2022-05-18 PROCEDURE — 74220 X-RAY XM ESOPHAGUS 1CNTRST: CPT | Mod: 26

## 2022-05-18 PROCEDURE — 74220 X-RAY XM ESOPHAGUS 1CNTRST: CPT

## 2022-05-18 PROCEDURE — 99213 OFFICE O/P EST LOW 20 MIN: CPT | Mod: 25

## 2022-05-25 NOTE — PHYSICAL EXAM
[Normal] : affect appropriate [de-identified] : Eyeglasses [de-identified] : soft, NT, ND, normoactive bowel sounds, no hepatosplenomegaly or masses appreciated\par incisions well healed, no drainage or bleeding, port palpable Left of midline (near abdominoplasty incision) without port site tenderness

## 2022-05-25 NOTE — ASSESSMENT
[FreeTextEntry1] : 71 year old F is 18 years out from Lap band placement and had lap band filled last visit. Current weight 143 lbs, weight stable since last visit.\par Otherwise doing well\par \par VE done today shows no obstruction, prolapse, dilatation nor esophageal scalloping/torturosity\par \par Counseled about nutrition guidelines\par Routine post adjustment nutritional counseling was provided. This patient should adhere to a liquid diet for the next 24- 48 hours advancing one stage per day to regular as tolerated.\par Encouraged better food choices - maintain food log\par Continue zero calorie liquid intake 64 oz per day \par Continue daily exercise regimen incorporating cardio/juice and strength training as tolerated \par Track steps - goal approximately 8-10k steps per day\par Discussed with patient to call the office if any symptoms of over restriction, reflux or other concerns \par \par \par Return to office in 2 month\par All questions answered\par \par \par Additional time spent before and after visit reviewing chart

## 2022-05-25 NOTE — HISTORY OF PRESENT ILLNESS
[Procedure: ___] : Procedure performed: [unfilled]  [Date of Surgery: ___] : Date of Surgery:   [unfilled] [Surgeon Name:   ___] : Surgeon Name: Dr. OLIVAS [Pre-Op Weight ___] : Pre-op weight was [unfilled] lbs [___ Years Post Op] : [unfilled] years [de-identified] : 71 year old F is 18 years out from Lap band placement and had lap band filled last visit. Current weight 143 lbs, weight stable since last visit on 4/19/2022. Pt states does not feel any different since last visit, where pt describes lack of satiety sensation. Pt is using self-control as previously was binge/emotional eating, consuming ice cream, popcorn, jelly beans, dry cereal. Still consuming an adequate amount of protein each meal, consuming 3 meals/day and 2 snacks/day (fruits or cereal). Pt is tolerating textured and solid foods without any issues. Drinking adequate zero calorie liquid per day, averaging 64 oz/day. Reports BM once every day. Pt is taking vitamin supplements daily. Pt is exercising - juice 4 times/wk. Pt is currently sleeping ~ 6-8 hours/night. No reflux, nausea, vomiting, constipation or diarrhea.\par

## 2022-05-25 NOTE — PROCEDURE
[FreeTextEntry1] : BAND adjustment was performed today. Using standard sterile technique, a 20 - gauge Almaraz needle was inserted into the LAP- BAND port.\par \par The patient was seated erect and tolerated water test without any symptoms of reflux, dysphagia or regurgitation.\par \par Under sterile conditions, the port was accessed without complication using manual guidance and non-coring needle. On access, approximately 1.5cc of fluid was found/measured, which was discarded. Adjustment was performed without complication. Amount of 1cc was filled into band, and aspirated again and instilled to confirm placement.

## 2022-07-20 ENCOUNTER — APPOINTMENT (OUTPATIENT)
Dept: BARIATRICS | Facility: CLINIC | Age: 72
End: 2022-07-20

## 2022-07-20 VITALS
DIASTOLIC BLOOD PRESSURE: 80 MMHG | TEMPERATURE: 96.8 F | WEIGHT: 142.2 LBS | OXYGEN SATURATION: 98 % | HEART RATE: 78 BPM | HEIGHT: 61 IN | SYSTOLIC BLOOD PRESSURE: 120 MMHG | BODY MASS INDEX: 26.85 KG/M2

## 2022-07-20 DIAGNOSIS — Z98.84 BARIATRIC SURGERY STATUS: ICD-10-CM

## 2022-07-20 PROCEDURE — G0447 BEHAVIOR COUNSEL OBESITY 15M: CPT | Mod: 59

## 2022-07-20 PROCEDURE — 99213 OFFICE O/P EST LOW 20 MIN: CPT

## 2022-07-20 RX ORDER — HYDROCHLOROTHIAZIDE 12.5 MG/1
12.5 CAPSULE ORAL DAILY
Refills: 0 | Status: ACTIVE | COMMUNITY
Start: 2021-05-07

## 2022-07-25 ENCOUNTER — APPOINTMENT (OUTPATIENT)
Dept: PHARMACY | Facility: CLINIC | Age: 72
End: 2022-07-25

## 2022-07-25 ENCOUNTER — APPOINTMENT (OUTPATIENT)
Dept: OTOLARYNGOLOGY | Facility: CLINIC | Age: 72
End: 2022-07-25

## 2022-07-25 VITALS
SYSTOLIC BLOOD PRESSURE: 147 MMHG | DIASTOLIC BLOOD PRESSURE: 88 MMHG | WEIGHT: 142 LBS | HEIGHT: 61 IN | HEART RATE: 70 BPM | BODY MASS INDEX: 26.81 KG/M2

## 2022-07-25 DIAGNOSIS — G47.33 OBSTRUCTIVE SLEEP APNEA (ADULT) (PEDIATRIC): ICD-10-CM

## 2022-07-25 DIAGNOSIS — H90.3 SENSORINEURAL HEARING LOSS, BILATERAL: ICD-10-CM

## 2022-07-25 PROCEDURE — 92557 COMPREHENSIVE HEARING TEST: CPT

## 2022-07-25 PROCEDURE — V5299A: CUSTOM | Mod: NC

## 2022-07-25 PROCEDURE — 92567 TYMPANOMETRY: CPT

## 2022-07-25 PROCEDURE — 99213 OFFICE O/P EST LOW 20 MIN: CPT

## 2022-07-25 RX ORDER — POTASSIUM CITRATE 10 MEQ/1
10 MEQ TABLET, EXTENDED RELEASE ORAL
Qty: 540 | Refills: 0 | Status: ACTIVE | COMMUNITY
Start: 2021-10-21

## 2022-07-25 RX ORDER — DULOXETINE HYDROCHLORIDE 30 MG/1
30 CAPSULE, DELAYED RELEASE PELLETS ORAL DAILY
Refills: 0 | Status: DISCONTINUED | COMMUNITY
Start: 2021-06-06 | End: 2022-07-25

## 2022-07-25 NOTE — ADDENDUM
[FreeTextEntry1] : Documented by Zaira Sorto acting as scribe for Dr. Rolle on 07/25/2022.\par \par All Medical record entries made by the scribe were at my, Dr. Rolle,direction and personally dictated by me on 07/25/2022. I have reviewed the chart and agree that the record accurately reflects my personal performance of the history, physical exam, assessment and plan. I have also personally directed, reviewed, and agreed with the discharge instructions.

## 2022-07-25 NOTE — PHYSICAL EXAM
[Hearing Sandoval Test (Tuning Fork On Forehead)] : no lateralization of tone [Midline] : trachea located in midline position [Normal] : orientation to person, place, and time: normal [FreeTextEntry8] : flaky cerumen removed via curettage  [FreeTextEntry9] : flaky cerumen removed via curettage  [FreeTextEntry1] : deviated septum b/l [de-identified] : logan exostosis on mandible internal and external [FreeTextEntry2] : sinuses nontender to percussion.

## 2022-07-25 NOTE — HISTORY OF PRESENT ILLNESS
[de-identified] : Pt presents with h/o bilateral SNHL, for which she wears amplification, reflux and dizziness - no further dizziness, nasal valve collapse, sleep apnea - uses oral appliance. Pt presents today for a hearing test. Pt notes she was told to see Dr. GONZALES before meeting with audiology. Pt notes she got an MRI brain .

## 2022-07-25 NOTE — DATA REVIEWED
[de-identified] : Tymps: Type A, au\par wnl - mild hf snhl 250-8khz, au\par Recs: 1)ENT f/u 2)re-eval as per md 3) continued use of HA's

## 2022-07-25 NOTE — ASSESSMENT
[FreeTextEntry1] : Reviewed and reconciled medications, allergies, PMHx, PSHx, SocHx, FMHx.\par \par h/o bilateral SNHL, for which she wears amplification, reflux and dizziness - no further dizziness, nasal valve collapse, sleep apnea - uses oral appliance\par cerumen removed b/l\par logan exostosis on mandible internal and external\par deviated septum b/l\par \par MRI brain 1/2021\par minimal chronic microvascular changes\par \par Audio 1/15/21\par hearing WNL sloping to a mild HF SNHL, right 100% discrimination at 60 db, left 92% discrimination at 55 db\par \par Audio: \par Tymps: Type A, au\par wnl - mild hf snhl 250-8khz, au\par 100% discrim at 65db AU\par no change\par \par Plan:\par Cerumen removed b/l. Discussed MRI brain results. Audio - results interpreted by Dr. Rolle and reviewed with the patient. FU 1 year.

## 2022-08-11 ENCOUNTER — APPOINTMENT (OUTPATIENT)
Dept: PHARMACY | Facility: CLINIC | Age: 72
End: 2022-08-11

## 2022-08-11 PROCEDURE — V5299A: CUSTOM | Mod: NC

## 2022-11-02 NOTE — H&P PST ADULT - WEIGHT IN KG
From: Janeth Leung Statom  To: Dr. Oskar Parr  Sent: 10/31/2022 9:09 AM EDT  Subject: Blood work to be done before procedure     I am scheduled to have the WPW procedure done on 12/7. When do I need to have the blood work completed prior to the procedure?   Thank you,   Yuliet Rushing
66.2

## 2023-01-23 ENCOUNTER — APPOINTMENT (OUTPATIENT)
Dept: PHARMACY | Facility: CLINIC | Age: 73
End: 2023-01-23
Payer: SELF-PAY

## 2023-01-23 PROCEDURE — V5299A: CUSTOM | Mod: NC

## 2023-01-27 ENCOUNTER — NON-APPOINTMENT (OUTPATIENT)
Age: 73
End: 2023-01-27

## 2023-01-27 ENCOUNTER — APPOINTMENT (OUTPATIENT)
Dept: BARIATRICS | Facility: CLINIC | Age: 73
End: 2023-01-27
Payer: MEDICARE

## 2023-01-27 VITALS
WEIGHT: 149.69 LBS | SYSTOLIC BLOOD PRESSURE: 148 MMHG | OXYGEN SATURATION: 99 % | BODY MASS INDEX: 28.26 KG/M2 | DIASTOLIC BLOOD PRESSURE: 85 MMHG | HEIGHT: 61 IN | HEART RATE: 68 BPM | TEMPERATURE: 97 F

## 2023-01-27 PROCEDURE — 99214 OFFICE O/P EST MOD 30 MIN: CPT

## 2023-01-27 RX ORDER — DULOXETINE HYDROCHLORIDE 30 MG/1
CAPSULE, DELAYED RELEASE ORAL DAILY
Refills: 0 | Status: ACTIVE | COMMUNITY
Start: 2021-12-13

## 2023-01-27 RX ORDER — METOPROLOL SUCCINATE 50 MG/1
50 TABLET, EXTENDED RELEASE ORAL
Qty: 90 | Refills: 1 | Status: ACTIVE | COMMUNITY

## 2023-01-27 RX ORDER — PANCRELIPASE 120000; 24000; 76000 [USP'U]/1; [USP'U]/1; [USP'U]/1
24000-76000 CAPSULE, DELAYED RELEASE PELLETS ORAL DAILY
Refills: 0 | Status: ACTIVE | COMMUNITY

## 2023-01-27 RX ORDER — BUPROPION HYDROCHLORIDE 300 MG/1
300 TABLET, EXTENDED RELEASE ORAL DAILY
Refills: 0 | Status: ACTIVE | COMMUNITY

## 2023-01-27 RX ORDER — ATORVASTATIN CALCIUM 10 MG/1
10 TABLET, FILM COATED ORAL
Qty: 90 | Refills: 3 | Status: ACTIVE | COMMUNITY

## 2023-01-27 RX ORDER — FAMOTIDINE 10 MG/1
TABLET, FILM COATED ORAL DAILY
Refills: 0 | Status: ACTIVE | COMMUNITY

## 2023-01-27 NOTE — ASSESSMENT
[FreeTextEntry1] : 71 year old F is 19 years out from Lap band placement and had lap band filled last visit. Weight stable since last visit. Pt states in a good place, having satiety at desired meal portion. Doing well.\par \par Counseled pt for 15 minutes about health maintenance principles including diet, healthy lifestyles, and exercise\par Patient re-educated on guidelines for nutrition - 3 meals/day\par Protein focus diet and adequate zero calorie liquid intake per day \par Daily activities and keep track of steps - goal 8-10k steps/day\par \par All questions answered \par \par Return to office in 6 months\par \par \par Additional time spent before and after visit reviewing chart

## 2023-01-27 NOTE — HISTORY OF PRESENT ILLNESS
[___ Years Post Op] : [unfilled] years [de-identified] : 71 year old F is 19 years out from Lap band placement and had lap band filled last visit. Weight stable since last visit. Pt states in a good place, having satiety at desired meal portion,especially during recent cruise trip. Still consuming an adequate amount of protein each meal, 3 meals/day. Drinking adequate zero calorie liquid per day. Continues to take miralax daily. Taking vitamin supplements daily. Pt is exercising - juice 4 times/wk. Sleeping ~ 6-8 hours/night. No any stuck episodes, reflux, nausea, or vomiting.\par  [Procedure: ___] : Procedure performed: [unfilled]  [Date of Surgery: ___] : Date of Surgery:   [unfilled] [Surgeon Name:   ___] : Surgeon Name: Dr. OLIVAS [Pre-Op Weight ___] : Pre-op weight was [unfilled] lbs

## 2023-01-27 NOTE — PHYSICAL EXAM
[de-identified] : Eyeglasses [de-identified] : soft, NT, ND, port palpable Left of midline (near abdominoplasty incision) without port site tenderness [Normal] : affect appropriate

## 2023-02-05 NOTE — HISTORY OF PRESENT ILLNESS
[Procedure: ___] : Procedure performed: [unfilled]  [Date of Surgery: ___] : Date of Surgery:   [unfilled] [Surgeon Name:   ___] : Surgeon Name: Dr. OLIVAS [Pre-Op Weight ___] : Pre-op weight was [unfilled] lbs [de-identified] : 72 year old F is s/p Lap band placement, presenting today for routine follow up. Weight gain since last visit, which pt attributes to falling off track with food choices due to recent cruises and  having pacemaker placed last week. Pt states still in a good place with restrictive sensation from last band fill in May 2022. Consuming an adequate protein intake with 3 meals/day. Reflux rarely - only when eating too much, continues to take Pepcid daily. Drinking adequate zero calorie liquid/day. Pt is seeing GI for workup of pancreatic insufficiency due to loose BM. Taking over the counter MVI daily. Exercising - juice 4 times/wk. Sleeping ~ 6-8 hours/night. No any stuck episodes,nausea, or vomiting.\par \par Pt sees outside nutritionist.\par \par Last VE 5/18/2022 normal.

## 2023-02-05 NOTE — ASSESSMENT
[FreeTextEntry1] : 72 year old F is s/p Lap band placement, presenting today for routine follow up. Weight gain since last visit, which pt attributes to falling off track with food choices due to recent cruises and  having pacemaker placed last week. Pt states still in a good place with restrictive sensation from last band fill in May 2022. Reflux rarely - only when eating too much, continues to take Pepcid daily. \par Pt is seeing GI for workup of pancreatic insufficiency due to loose BM. Also seeing outside nutritionist. \par \par Last VE 5/18/2022 normal \par \par Reviewed nutrition and exercise guidelines\par Patient re-educated on protein focused 3 meals/day\par Daily adequate zero calorie liquid intake - goal 64oz/day \par Daily activities and keep track of steps - goal 8-10k steps/day\par Continue Pepcid\par \par All questions answered \par Follow up with outside nutritionist\par Return to office in 6 months\par VE prior to next visit\par \par \par Additional time spent before and after visit reviewing chart

## 2023-02-05 NOTE — HISTORY OF PRESENT ILLNESS
[Procedure: ___] : Procedure performed: [unfilled]  [Date of Surgery: ___] : Date of Surgery:   [unfilled] [Surgeon Name:   ___] : Surgeon Name: Dr. OLIVAS [Pre-Op Weight ___] : Pre-op weight was [unfilled] lbs [de-identified] : 72 year old F is s/p Lap band placement, presenting today for routine follow up. Weight gain since last visit, which pt attributes to falling off track with food choices due to recent cruises and  having pacemaker placed last week. Pt states still in a good place with restrictive sensation from last band fill in May 2022. Consuming an adequate protein intake with 3 meals/day. Reflux rarely - only when eating too much, continues to take Pepcid daily. Drinking adequate zero calorie liquid/day. Pt is seeing GI for workup of pancreatic insufficiency due to loose BM. Taking over the counter MVI daily. Exercising - juice 4 times/wk. Sleeping ~ 6-8 hours/night. No any stuck episodes,nausea, or vomiting.\par \par Pt sees outside nutritionist.\par \par Last VE 5/18/2022 normal.

## 2023-02-05 NOTE — REVIEW OF SYSTEMS
[Negative] : Allergic/Immunologic [Reflux/Heartburn] : reflux/heartburn [FreeTextEntry7] : reflux rarely when eat too much

## 2023-02-05 NOTE — PHYSICAL EXAM
[Normal] : affect appropriate [de-identified] : Eyeglasses [de-identified] : soft, NT, ND, port palpable Left of midline (near abdominoplasty incision) without port site tenderness, erythema or swelling

## 2023-02-06 ENCOUNTER — APPOINTMENT (OUTPATIENT)
Dept: PHARMACY | Facility: CLINIC | Age: 73
End: 2023-02-06
Payer: SELF-PAY

## 2023-02-06 PROCEDURE — V5299A: CUSTOM | Mod: NC

## 2023-05-11 ENCOUNTER — APPOINTMENT (OUTPATIENT)
Dept: PHARMACY | Facility: CLINIC | Age: 73
End: 2023-05-11
Payer: SELF-PAY

## 2023-05-11 PROCEDURE — V5299A: CUSTOM | Mod: NC

## 2023-06-05 ENCOUNTER — APPOINTMENT (OUTPATIENT)
Dept: OBGYN | Facility: CLINIC | Age: 73
End: 2023-06-05
Payer: MEDICARE

## 2023-06-05 VITALS
HEIGHT: 61 IN | DIASTOLIC BLOOD PRESSURE: 70 MMHG | WEIGHT: 149 LBS | OXYGEN SATURATION: 98 % | HEART RATE: 63 BPM | BODY MASS INDEX: 28.13 KG/M2 | TEMPERATURE: 97.7 F | SYSTOLIC BLOOD PRESSURE: 138 MMHG

## 2023-06-05 PROCEDURE — 99203 OFFICE O/P NEW LOW 30 MIN: CPT

## 2023-06-06 RX ORDER — DENOSUMAB 60 MG/ML
60 INJECTION SUBCUTANEOUS AS DIRECTED
Qty: 1 | Refills: 2 | Status: ACTIVE | COMMUNITY
Start: 2023-06-06 | End: 1900-01-01

## 2023-06-06 NOTE — HISTORY OF PRESENT ILLNESS
[Patient reported mammogram was normal] : Patient reported mammogram was normal [Patient reported breast sonogram was normal] : Patient reported breast sonogram was normal [Patient reported bone density results were abnormal] : Patient reported bone density results were abnormal [FreeTextEntry1] : 71 yo transferring  GYN care. Patient currently being treated with Prolia for Osteoporosis in the forearm -3.3, lumbar spine normal. Hip views not performed due to h/o of hip replacement. Patient received 2 treatments and reports no side effects. Patient recently had  blood work and  annual Gynecological exam. Medical records requested  are not available for this visit. Osteoporosis education provided including treatment options,   weight exercises, nutrition, vitamin supplements, and fall precautions. Advised to schedule a follow up appointment after her medical record become available.  [Mammogramdate] : 03/23 [BreastSonogramDate] : 03/23 [BoneDensityDate] : 03/22 [TextBox_37] : Osteoporosis

## 2023-06-06 NOTE — COUNSELING
[Nutrition/ Exercise/ Weight Management] : nutrition, exercise, weight management [Vitamins/Supplements] : vitamins/supplements [Medication Management] : medication management [FreeTextEntry2] : Bone health, fall precautions

## 2023-07-17 ENCOUNTER — APPOINTMENT (OUTPATIENT)
Dept: OBGYN | Facility: CLINIC | Age: 73
End: 2023-07-17
Payer: MEDICARE

## 2023-07-17 VITALS
HEIGHT: 61 IN | HEART RATE: 65 BPM | DIASTOLIC BLOOD PRESSURE: 72 MMHG | SYSTOLIC BLOOD PRESSURE: 110 MMHG | OXYGEN SATURATION: 98 % | TEMPERATURE: 98 F | WEIGHT: 147 LBS | BODY MASS INDEX: 27.75 KG/M2

## 2023-07-17 DIAGNOSIS — Z87.39 PERSONAL HISTORY OF OTHER DISEASES OF THE MUSCULOSKELETAL SYSTEM AND CONNECTIVE TISSUE: ICD-10-CM

## 2023-07-17 PROCEDURE — 96372 THER/PROPH/DIAG INJ SC/IM: CPT

## 2023-07-17 PROCEDURE — 99213 OFFICE O/P EST LOW 20 MIN: CPT | Mod: 25

## 2023-07-17 NOTE — HISTORY OF PRESENT ILLNESS
[FreeTextEntry1] : 73 yo presents for Prolia injection. Patient previously given two dosages. Patient reports no side effect and complications. Patient advised to get dental XRAY baseline. Advised to increase exercises- weight bearing, vitamin supplements and fall precautions.

## 2023-07-17 NOTE — COUNSELING
[Nutrition/ Exercise/ Weight Management] : nutrition, exercise, weight management [Vitamins/Supplements] : vitamins/supplements [Medication Management] : medication management [FreeTextEntry2] : Bone health-weight bearing exercises

## 2023-07-20 NOTE — ED PROVIDER NOTE - CPE EDP ENMT NORM
-- Labs now - [facility].  -- Medication Changes:   CONTINUE: Synthroid 175mcg daily  -- Clinically and biochemically euthyroid.  -- Goal is a normal TSH.  -- Avoid exogenous hyperthyroidism as this can accelerate bone loss and increase risk of CV complications.  -- Advised to take LT4 on an empty stomach with water and to wait 30-45 minutes before eating or taking other medications   -- Reviewed usual times of thyroid hormone changes  -- Reviewed that symptoms of hypothyroidism may not correlate with tsh, and a normal TSH is the goal of therapy. Symptoms are not a justification for over treatment.  
normal...

## 2023-07-28 ENCOUNTER — OUTPATIENT (OUTPATIENT)
Dept: OUTPATIENT SERVICES | Facility: HOSPITAL | Age: 73
LOS: 1 days | End: 2023-07-28
Payer: MEDICARE

## 2023-07-28 ENCOUNTER — APPOINTMENT (OUTPATIENT)
Dept: BARIATRICS | Facility: CLINIC | Age: 73
End: 2023-07-28
Payer: MEDICARE

## 2023-07-28 VITALS
HEIGHT: 61 IN | DIASTOLIC BLOOD PRESSURE: 80 MMHG | WEIGHT: 149.03 LBS | SYSTOLIC BLOOD PRESSURE: 120 MMHG | TEMPERATURE: 96.8 F | OXYGEN SATURATION: 97 % | BODY MASS INDEX: 28.14 KG/M2 | HEART RATE: 67 BPM

## 2023-07-28 DIAGNOSIS — Z98.890 OTHER SPECIFIED POSTPROCEDURAL STATES: Chronic | ICD-10-CM

## 2023-07-28 DIAGNOSIS — Z98.84 BARIATRIC SURGERY STATUS: Chronic | ICD-10-CM

## 2023-07-28 DIAGNOSIS — Z98.84 BARIATRIC SURGERY STATUS: ICD-10-CM

## 2023-07-28 DIAGNOSIS — Z90.710 ACQUIRED ABSENCE OF BOTH CERVIX AND UTERUS: Chronic | ICD-10-CM

## 2023-07-28 DIAGNOSIS — Z96.643 PRESENCE OF ARTIFICIAL HIP JOINT, BILATERAL: Chronic | ICD-10-CM

## 2023-07-28 DIAGNOSIS — Z96.651 PRESENCE OF RIGHT ARTIFICIAL KNEE JOINT: Chronic | ICD-10-CM

## 2023-07-28 PROCEDURE — 99214 OFFICE O/P EST MOD 30 MIN: CPT | Mod: 25

## 2023-07-28 PROCEDURE — 74220 X-RAY XM ESOPHAGUS 1CNTRST: CPT | Mod: 26

## 2023-07-28 PROCEDURE — S2083 ADJUSTMENT GASTRIC BAND: CPT

## 2023-07-28 PROCEDURE — 74220 X-RAY XM ESOPHAGUS 1CNTRST: CPT

## 2023-07-28 NOTE — HISTORY OF PRESENT ILLNESS
[Procedure: ___] : Procedure performed: [unfilled]  [Date of Surgery: ___] : Date of Surgery:   [unfilled] [Surgeon Name:   ___] : Surgeon Name: Dr. OLIVAS [Pre-Op Weight ___] : Pre-op weight was [unfilled] lbs [de-identified] : 72 year old F is s/p Lap band placement, presenting today for band deflation. Weight stable  since last visit, which pt attributes to falling off track with food choices due to   being diagnosed with cancer.  Consuming an adequate protein intake with 3 meals/day. C/O multiple stuck episodes since last fill. Pt had VE this morning that showed no evidence of obstruction or reflux. Pt would like band completely deflated.  Drinking adequate zero calorie liquid/day.  Taking over the counter MVI daily. Exercising - juice 4 times/wk. Sleeping ~ 6-8 hours/night. No No abdominal pain, diarrhea or constipation.\par \par Last VE 7/28/2023

## 2023-07-28 NOTE — PHYSICAL EXAM
[Normal] : affect appropriate [de-identified] : Eyeglasses [de-identified] : soft, NT, ND, port palpable Left of midline (near abdominoplasty incision) without port site tenderness, erythema or swelling

## 2023-07-28 NOTE — REVIEW OF SYSTEMS
[Reflux/Heartburn] : reflux/heartburn [Negative] : Allergic/Immunologic [FreeTextEntry7] : reflux rarely when eat too much

## 2023-07-28 NOTE — ASSESSMENT
[FreeTextEntry1] : 72 year old F is s/p Lap band placement, presenting today for band deflation. Pt had VE this morning that showed slow transmission   No  abdominal pain, diarrhea or constipation.\par \par Last VE 7/28/2023 normal \par \par LAP-BAND deflation was performed today. Using standard sterile technique, a 20 - gauge Almaraz needle was inserted into the LAP- BAND port.\par \par \par Under sterile conditions, the port was accessed without complication using manual guidance and non coring needle. On access, 1 .0 cc fluid was found/measured and removed without complication.  Patient was counseled post deflation regarding dietary recommendations. Discussed with patient to call or return to the office if any symptoms of  reflux or other concerns.\par \par \par Band deflation performed today\par Routine post adjustment nutritional counseling was provided\par Encouraged better food choices - maintain food log\par Zero calorie liquid intake 64 oz per day \par Encouraged to participate in a daily exercise regimen incorporating cardio and strength training\par Discussed with patient to call the office if any symptoms \par \par \par Return to office in 1 year\par All questions answered\par \par \par Additional time spent before and after visit reviewing chart

## 2023-09-25 ENCOUNTER — APPOINTMENT (OUTPATIENT)
Dept: PHARMACY | Facility: CLINIC | Age: 73
End: 2023-09-25
Payer: SELF-PAY

## 2023-09-25 PROCEDURE — V5266C: CUSTOM | Mod: LT

## 2023-10-03 ENCOUNTER — APPOINTMENT (OUTPATIENT)
Dept: PHARMACY | Facility: CLINIC | Age: 73
End: 2023-10-03
Payer: SELF-PAY

## 2023-10-03 PROCEDURE — V5299A: CUSTOM | Mod: NC

## 2024-01-31 ENCOUNTER — APPOINTMENT (OUTPATIENT)
Dept: OBGYN | Facility: CLINIC | Age: 74
End: 2024-01-31
Payer: MEDICARE

## 2024-01-31 VITALS
BODY MASS INDEX: 28.13 KG/M2 | HEART RATE: 79 BPM | DIASTOLIC BLOOD PRESSURE: 80 MMHG | SYSTOLIC BLOOD PRESSURE: 124 MMHG | HEIGHT: 61 IN | TEMPERATURE: 97.6 F | OXYGEN SATURATION: 98 % | WEIGHT: 149 LBS

## 2024-01-31 DIAGNOSIS — Z12.39 ENCOUNTER FOR OTHER SCREENING FOR MALIGNANT NEOPLASM OF BREAST: ICD-10-CM

## 2024-01-31 DIAGNOSIS — Z01.419 ENCOUNTER FOR GYNECOLOGICAL EXAMINATION (GENERAL) (ROUTINE) W/OUT ABNORMAL FINDINGS: ICD-10-CM

## 2024-01-31 DIAGNOSIS — N39.3 STRESS INCONTINENCE (FEMALE) (MALE): ICD-10-CM

## 2024-01-31 DIAGNOSIS — N95.2 POSTMENOPAUSAL ATROPHIC VAGINITIS: ICD-10-CM

## 2024-01-31 PROCEDURE — 81003 URINALYSIS AUTO W/O SCOPE: CPT | Mod: QW

## 2024-01-31 PROCEDURE — G0101: CPT

## 2024-01-31 PROCEDURE — 99214 OFFICE O/P EST MOD 30 MIN: CPT | Mod: 25

## 2024-01-31 RX ORDER — DENOSUMAB 60 MG/ML
60 INJECTION SUBCUTANEOUS
Qty: 1 | Refills: 2 | Status: ACTIVE | COMMUNITY
Start: 2024-01-31 | End: 1900-01-01

## 2024-02-01 LAB
APPEARANCE: CLEAR
BACTERIA: NEGATIVE /HPF
BILIRUBIN URINE: NEGATIVE
BLOOD URINE: ABNORMAL
CAST: 11 /LPF
COARSE GRANULAR CASTS: PRESENT
COLOR: YELLOW
EPITHELIAL CELLS: 1 /HPF
GLUCOSE QUALITATIVE U: NEGATIVE MG/DL
HYALINE CASTS: PRESENT
KETONES URINE: NEGATIVE MG/DL
LEUKOCYTE ESTERASE URINE: ABNORMAL
MICROSCOPIC-UA: NORMAL
MUCUS: PRESENT
NITRITE URINE: NEGATIVE
PH URINE: 8
PROTEIN URINE: NORMAL MG/DL
RED BLOOD CELLS URINE: 10 /HPF
REVIEW: NORMAL
SPECIFIC GRAVITY URINE: 1.02
UROBILINOGEN URINE: 1 MG/DL
WHITE BLOOD CELLS URINE: 5 /HPF

## 2024-02-02 LAB — BACTERIA UR CULT: NORMAL

## 2024-02-02 NOTE — PHYSICAL EXAM
[Chaperone Present] : A chaperone was present in the examining room during all aspects of the physical examination [Appropriately responsive] : appropriately responsive [Alert] : alert [No Acute Distress] : no acute distress [No Lymphadenopathy] : no lymphadenopathy [Soft] : soft [Non-tender] : non-tender [Non-distended] : non-distended [Oriented x3] : oriented x3 [Examination Of The Breasts] : a normal appearance [No Masses] : no breast masses were palpable [Vulvar Atrophy] : vulvar atrophy [Labia Majora] : normal [Atrophy] : atrophy [Normal] : normal [Uterine Adnexae] : normal

## 2024-02-02 NOTE — COUNSELING
[Nutrition/ Exercise/ Weight Management] : nutrition, exercise, weight management [Vitamins/Supplements] : vitamins/supplements [Breast Self Exam] : breast self exam [Bladder Hygiene] : bladder hygiene [FreeTextEntry2] : Bone health-fall precautions.  Bladder care

## 2024-02-09 ENCOUNTER — APPOINTMENT (OUTPATIENT)
Dept: OTOLARYNGOLOGY | Facility: CLINIC | Age: 74
End: 2024-02-09
Payer: MEDICARE

## 2024-02-09 ENCOUNTER — APPOINTMENT (OUTPATIENT)
Dept: PHARMACY | Facility: CLINIC | Age: 74
End: 2024-02-09
Payer: SELF-PAY

## 2024-02-09 VITALS
WEIGHT: 156 LBS | DIASTOLIC BLOOD PRESSURE: 94 MMHG | BODY MASS INDEX: 29.45 KG/M2 | SYSTOLIC BLOOD PRESSURE: 141 MMHG | HEART RATE: 65 BPM | HEIGHT: 61 IN

## 2024-02-09 DIAGNOSIS — K12.30 OTHER FORMS OF STOMATITIS: ICD-10-CM

## 2024-02-09 DIAGNOSIS — K12.1 OTHER FORMS OF STOMATITIS: ICD-10-CM

## 2024-02-09 DIAGNOSIS — J38.4 EDEMA OF LARYNX: ICD-10-CM

## 2024-02-09 DIAGNOSIS — J31.0 CHRONIC RHINITIS: ICD-10-CM

## 2024-02-09 DIAGNOSIS — J34.2 DEVIATED NASAL SEPTUM: ICD-10-CM

## 2024-02-09 DIAGNOSIS — K21.9 GASTRO-ESOPHAGEAL REFLUX DISEASE W/OUT ESOPHAGITIS: ICD-10-CM

## 2024-02-09 DIAGNOSIS — R09.89 OTHER SPECIFIED SYMPTOMS AND SIGNS INVOLVING THE CIRCULATORY AND RESPIRATORY SYSTEMS: ICD-10-CM

## 2024-02-09 DIAGNOSIS — M95.0 ACQUIRED DEFORMITY OF NOSE: ICD-10-CM

## 2024-02-09 PROCEDURE — 92567 TYMPANOMETRY: CPT

## 2024-02-09 PROCEDURE — 92557 COMPREHENSIVE HEARING TEST: CPT

## 2024-02-09 PROCEDURE — 99213 OFFICE O/P EST LOW 20 MIN: CPT | Mod: 25

## 2024-02-09 PROCEDURE — 31575 DIAGNOSTIC LARYNGOSCOPY: CPT

## 2024-02-09 PROCEDURE — V5010 ASSESSMENT FOR HEARING AID: CPT | Mod: NC

## 2024-02-09 RX ORDER — MELATONIN 3 MG
TABLET ORAL
Refills: 0 | Status: ACTIVE | COMMUNITY

## 2024-02-09 RX ORDER — FLUTICASONE PROPIONATE 50 UG/1
50 SPRAY, METERED NASAL
Qty: 1 | Refills: 5 | Status: ACTIVE | COMMUNITY
Start: 2024-02-09 | End: 1900-01-01

## 2024-02-09 NOTE — PROCEDURE
[Complicated Symptoms] : complicated symptoms requiring more thorough examination than provided by mirror [de-identified] :  Procedure: Flexible Fiberoptic Laryngoscopy: Risks, benefits, and alternatives of flexible endoscopy were explained to the patient. The patient gave oral consent to proceed. The flexible scope was inserted into the right nasal cavity and advanced towards the nasopharynx. Visualized mucosa over the turbinates and septum were as described above. The nasopharynx was clear. Oropharyngeal walls were symmetric and mobile without lesion, mass, or edema. Hypopharynx was also without lesion or edema. Larynx was mobile without lesions. Supraglottic structures were free of mass and asymmetry, but edema and erythema of the postcricoid, arytenoids and interarytenoids. True vocal folds were white without mass or lesion. Base of tongue was within normal limits. -slight narrowing at level of soft palate and uvula -BOT normal -Edema and erythema of the postcricoid, arytenoids and interarytenoids. -pooling postcricoid

## 2024-02-09 NOTE — HISTORY OF PRESENT ILLNESS
[de-identified] : Pt presents with h/o bilateral SNHL, for which she wears amplification, reflux and dizziness - no further dizziness, nasal valve collapse, sleep apnea - uses oral appliance presents today stating that her nose is always itchy and always running. Pt states that she always has a thick mucus in her throat. Pt states that she drinks a lot of water. Pt states that she needs a hearing test to get new hearing aids. Pt states that she takes Pepcid once a day for her reflux.

## 2024-02-09 NOTE — ADDENDUM
[FreeTextEntry1] :  Documented by Maldonado Ramirez acting as scribe for Dr. Rolle on 02/09/2024. All Medical record entries made by the Scribe were at my, Dr. Rolle, direction and personally dictated by me on 02/09/2024 . I have reviewed the chart and agree that the record accurately reflects my personal performance of the history, physical exam, assessment and plan. I have also personally directed, reviewed, and agreed with the discharge instructions.

## 2024-02-09 NOTE — ASSESSMENT
[FreeTextEntry1] :  Reviewed and reconciled medications, allergies, PMHx, PSHx, SocHx, FMHx.  Pt presents with h/o bilateral SNHL, for which she wears amplification, reflux and dizziness - no further dizziness, nasal valve collapse, sleep apnea - uses oral appliance presents today stating that her nose is always itchy and always running. Pt states that she always has a thick mucus in her throat. Pt states that she drinks a lot of water. Pt states that she needs a hearing test to get new hearing aids. Pt states that she takes Pepcid once a day for her reflux.   Physical exam: -left ear: cerumen removed via curettage -no lateralization to tuning forks -minimally deviated septum -mildly inflamed turbinates  ENT Procedure: Flexible laryngoscopy -slight narrowing at level of soft palate and uvula -BOT normal -Edema and erythema of the postcricoid, arytenoids and interarytenoids. -pooling postcricoid  Audio: -Type A Tymps AU -Hearing ESS within/borderline normal 250-8000 Hz (mild loss noted at 8000 Hz in left ear) -100% understanding at 55 dB in right ear -100% understanding at 60 dB in left ear  Plan:  Audio - results interpreted by Dr. Rolle and reviewed with the patient. -Start Pepcid twice per day -Start Flonase - 2 sprays bilaterally QD, spray laterally. -discussed r/b/a of Astelin if Flonase does not improve symptoms -FU in 1 year

## 2024-02-09 NOTE — PHYSICAL EXAM
[Hearing Sandoval Test (Tuning Fork On Forehead)] : no lateralization of tone [] : septum deviated bilaterally [Normal] : mucosa is normal [Midline] : trachea located in midline position [Hearing Loss Right Only] : normal [Hearing Loss Left Only] : normal [FreeTextEntry9] :  cerumen removed via curettage [de-identified] :  mildly inflamed turbinates

## 2024-02-12 ENCOUNTER — APPOINTMENT (OUTPATIENT)
Dept: OBGYN | Facility: CLINIC | Age: 74
End: 2024-02-12
Payer: MEDICARE

## 2024-02-12 VITALS
HEIGHT: 61 IN | BODY MASS INDEX: 29.45 KG/M2 | WEIGHT: 156 LBS | SYSTOLIC BLOOD PRESSURE: 138 MMHG | OXYGEN SATURATION: 98 % | TEMPERATURE: 97.5 F | DIASTOLIC BLOOD PRESSURE: 80 MMHG | HEART RATE: 70 BPM

## 2024-02-12 DIAGNOSIS — M81.0 AGE-RELATED OSTEOPOROSIS W/OUT CURRENT PATHOLOGICAL FRACTURE: ICD-10-CM

## 2024-02-12 PROCEDURE — 99213 OFFICE O/P EST LOW 20 MIN: CPT

## 2024-02-12 PROCEDURE — 99203 OFFICE O/P NEW LOW 30 MIN: CPT

## 2024-02-12 RX ORDER — DENOSUMAB 60 MG/ML
60 INJECTION SUBCUTANEOUS
Refills: 0 | Status: ACTIVE | COMMUNITY

## 2024-02-12 NOTE — COUNSELING
[Nutrition/ Exercise/ Weight Management] : nutrition, exercise, weight management [Vitamins/Supplements] : vitamins/supplements [Breast Self Exam] : breast self exam [Medication Management] : medication management [FreeTextEntry2] : Bone health-fall precautions; Wt-bearing exercises

## 2024-02-18 LAB
BILIRUB UR QL STRIP: NEGATIVE
COLLECTION METHOD: NORMAL
CYTOLOGY CVX/VAG DOC THIN PREP: NORMAL
GLUCOSE UR-MCNC: NEGATIVE
HCG UR QL: 0.2 EU/DL
HGB UR QL STRIP.AUTO: NORMAL
KETONES UR-MCNC: NEGATIVE
LEUKOCYTE ESTERASE UR QL STRIP: NEGATIVE
NITRITE UR QL STRIP: NEGATIVE
PH UR STRIP: 7.5
PROT UR STRIP-MCNC: NEGATIVE
SP GR UR STRIP: 1.01

## 2024-03-20 ENCOUNTER — NON-APPOINTMENT (OUTPATIENT)
Age: 74
End: 2024-03-20

## 2024-03-20 DIAGNOSIS — N60.19 DIFFUSE CYSTIC MASTOPATHY OF UNSPECIFIED BREAST: ICD-10-CM

## 2024-08-01 ENCOUNTER — APPOINTMENT (OUTPATIENT)
Dept: BARIATRICS | Facility: CLINIC | Age: 74
End: 2024-08-01
Payer: MEDICARE

## 2024-08-01 ENCOUNTER — OUTPATIENT (OUTPATIENT)
Dept: OUTPATIENT SERVICES | Facility: HOSPITAL | Age: 74
LOS: 1 days | End: 2024-08-01
Payer: MEDICARE

## 2024-08-01 VITALS
SYSTOLIC BLOOD PRESSURE: 136 MMHG | OXYGEN SATURATION: 95 % | BODY MASS INDEX: 29.89 KG/M2 | HEIGHT: 61 IN | DIASTOLIC BLOOD PRESSURE: 87 MMHG | WEIGHT: 158.29 LBS | HEART RATE: 55 BPM | TEMPERATURE: 96.8 F

## 2024-08-01 DIAGNOSIS — Z98.890 OTHER SPECIFIED POSTPROCEDURAL STATES: Chronic | ICD-10-CM

## 2024-08-01 DIAGNOSIS — Z98.84 BARIATRIC SURGERY STATUS: ICD-10-CM

## 2024-08-01 DIAGNOSIS — Z96.651 PRESENCE OF RIGHT ARTIFICIAL KNEE JOINT: Chronic | ICD-10-CM

## 2024-08-01 DIAGNOSIS — Z00.00 ENCOUNTER FOR GENERAL ADULT MEDICAL EXAMINATION W/OUT ABNORMAL FINDINGS: ICD-10-CM

## 2024-08-01 DIAGNOSIS — E46 UNSPECIFIED PROTEIN-CALORIE MALNUTRITION: ICD-10-CM

## 2024-08-01 DIAGNOSIS — Z96.643 PRESENCE OF ARTIFICIAL HIP JOINT, BILATERAL: Chronic | ICD-10-CM

## 2024-08-01 DIAGNOSIS — Z90.710 ACQUIRED ABSENCE OF BOTH CERVIX AND UTERUS: Chronic | ICD-10-CM

## 2024-08-01 DIAGNOSIS — Z98.84 BARIATRIC SURGERY STATUS: Chronic | ICD-10-CM

## 2024-08-01 PROCEDURE — G2211 COMPLEX E/M VISIT ADD ON: CPT

## 2024-08-01 PROCEDURE — 74220 X-RAY XM ESOPHAGUS 1CNTRST: CPT

## 2024-08-01 PROCEDURE — 74220 X-RAY XM ESOPHAGUS 1CNTRST: CPT | Mod: 26

## 2024-08-01 PROCEDURE — 99214 OFFICE O/P EST MOD 30 MIN: CPT

## 2024-08-01 RX ORDER — SERTRALINE HYDROCHLORIDE 25 MG/1
TABLET, FILM COATED ORAL
Refills: 0 | Status: ACTIVE | COMMUNITY

## 2024-08-01 NOTE — ASSESSMENT
[FreeTextEntry1] : 73 year old F is s/p Lap band placement, presenting today for routine follow up to "check if lap band is ok". Last band access July 2023 - emptied due to multiple stuck episodes, now pt reports no symptoms. Weight gain since last visit, though pt states overall weight loss as she works with her outside nutritionist. Overall doing well.  VE today, reviewed with Dr. Garcia  Demonstrates good contrast flow, no prolapse nor obstruction, tubing and port intact  Reviewed guidelines about nutrition and snacking - protein focus diet with 3 meals/day Discussed food choices and timing of meals with front loading early in the day Avoid processed/refined foods Maintain adequate daily zero calorie liquid intake, goal of 64 oz/day Maintain daily exercise regimen incorporating cardio and strength training as tolerated Daily goal 8-10k steps/day Continue Pepcid  Follow up with outside nutritionist Return to office in 12months No VE needed prior to next visit Reviewed with pt to if experiencing any symptoms of abdominal pain, redness/swelling around port, fevers to call the office  Pt verbalized understanding and in agreement of the above   Pt seen with Dr. Garcia   Additional time spent before and after visit reviewing chart

## 2024-08-01 NOTE — HISTORY OF PRESENT ILLNESS
"Encounter Date: 2/18/2018       History     Chief Complaint   Patient presents with    Knee Pain     left knee pain after hitting floor last night     The history is provided by the patient.   Leg Pain    Incident location: "while out at a club". The injury mechanism was a fall. The incident occurred yesterday. The pain is present in the left knee. The quality of the pain is described as aching. The pain is at a severity of 8/10. The pain has been fluctuating since onset. Pertinent negatives include no numbness, no inability to bear weight, no loss of motion, no muscle weakness, no loss of sensation and no tingling. The symptoms are aggravated by activity, bearing weight and palpation. He has tried nothing for the symptoms.       PCP:   Constantin Mayorga MD        Review of patient's allergies indicates:  No Known Allergies  Past Medical History:   Diagnosis Date    Diabetes mellitus     Hypertension      Past Surgical History:   Procedure Laterality Date    NO PAST SURGERIES       History reviewed. No pertinent family history.  Social History   Substance Use Topics    Smoking status: Current Some Day Smoker     Packs/day: 1.00     Types: Cigarettes    Smokeless tobacco: Never Used      Comment: 2 black and mild per day    Alcohol use Yes      Comment: last night, weekends     Review of Systems   Constitutional: Negative for chills and fever.   HENT: Negative for congestion and sore throat.    Respiratory: Negative for chest tightness and shortness of breath.    Cardiovascular: Negative for chest pain.   Gastrointestinal: Negative for nausea.   Genitourinary: Negative for dysuria.   Musculoskeletal: Negative for back pain.        Positive for left knee pain.    Skin: Negative for rash.   Neurological: Negative for dizziness, tingling, weakness, numbness and headaches.   Hematological: Does not bruise/bleed easily.       Physical Exam     Initial Vitals [02/18/18 1348]   BP Pulse Resp Temp SpO2   (!) 172/106 " 74 20 97.8 °F (36.6 °C) 98 %      MAP       128         Physical Exam    Nursing note and vitals reviewed.  Constitutional: He appears well-developed and well-nourished. He is cooperative. He does not appear ill. No distress.   HENT:   Head: Normocephalic and atraumatic.   Right Ear: Hearing and external ear normal.   Left Ear: Hearing and external ear normal.   Nose: Nose normal.   Mouth/Throat: Uvula is midline, oropharynx is clear and moist and mucous membranes are normal.   Eyes: Conjunctivae and lids are normal. Pupils are equal, round, and reactive to light.   Neck: Trachea normal and normal range of motion. Neck supple. No spinous process tenderness and no muscular tenderness present. No neck rigidity.   Cardiovascular: Normal rate, regular rhythm, intact distal pulses and normal pulses.   Pulmonary/Chest: Effort normal. No accessory muscle usage. No respiratory distress.   Musculoskeletal: Normal range of motion. He exhibits no edema.        Left knee: He exhibits normal range of motion, no swelling, no deformity, no erythema, no LCL laxity and no MCL laxity. Tenderness (reproducible tenderness noted to medial joint line with point tenderness present at the tibial plateau area - no crepitus or obvious deformity noted - neurovascular intact distally) found. Medial joint line tenderness noted.   Neurological: He is alert and oriented to person, place, and time. He has normal strength. Gait normal. GCS eye subscore is 4. GCS verbal subscore is 5. GCS motor subscore is 6.   Neurovascular intact to all extremities.    Skin: Skin is warm, dry and intact. Capillary refill takes less than 2 seconds. No abrasion, no bruising, no ecchymosis and no rash noted.   Psychiatric: He has a normal mood and affect. His speech is normal and behavior is normal. Cognition and memory are normal.         ED Course   Procedures    ED Imaging Results:   Imaging Results          X-Ray Knee Complete 4 or more Views Left (Final result)   "Result time 02/18/18 14:59:26   Procedure changed from X-Ray Knee 3 View Left     Final result by Jace Harrison MD (02/18/18 14:59:26)                 Impression:         Negative.        Electronically signed by: JACE HARRISON MD  Date:     02/18/18  Time:    14:59              Narrative:    Exam: XR KNEE COMP 4 OR MORE VIEWS LEFT    Clinical History:    Acute left knee pain. Initial encounter.    Findings:      No osseous, articular, or soft tissue abnormality demonstrated.                              ED Medications:   Medications   cloNIDine tablet 0.1 mg (0.1 mg Oral Given 2/18/18 1450)   ketorolac tablet 10 mg (10 mg Oral Given 2/18/18 1450)         ED Course Vitals  Vitals:    02/18/18 1348 02/18/18 1513   BP: (!) 172/106 (!) 150/90   Pulse: 74 75   Resp: 20    Temp: 97.8 °F (36.6 °C)    TempSrc: Oral    SpO2: 98%    Weight: 103.4 kg (228 lb)    Height: 6' 1" (1.854 m)          1510 HOURS RE-EVALUATION & DISPOSITION:   Reassessment at the time of disposition demonstrates that the patient is resting comfortably in no acute distress.  He has remained hemodynamically stable throughout the entire ED visit and is without objective evidence for acute process requiring urgent intervention or hospitalization. I discussed test results and provided counseling to patient with regard to condition, the treatment plan, specific conditions for return, and the importance of follow up.  Answered questions at this time. The patient is stable for discharge.              X-Rays:   Independently Interpreted Readings:   Other Readings:  Radiographs of the left knee reveal no acute findings.     Medical Decision Making:   History:   Old Records Summarized: records from clinic visits.  Clinical Tests:   Radiological Study: Ordered and Reviewed    Additional MDM:   Hypertension: Medications given: Catapres PO. Response: The patient's BP was controlled using oral medications. The patient's condition was felt to be stable.               " [Procedure: ___] : Procedure performed: [unfilled]       Clinical Impression:       ICD-10-CM ICD-9-CM   1. Contusion of left knee, initial encounter S80.02XA 924.11   2. Left knee pain M25.562 719.46   3. Essential hypertension I10 401.9         Disposition:   Disposition: Discharged  Condition: Stable  I discussed with patient that the evaluation in the emergency department does not suggest any emergent or life threatening medical condition requiring immediate intervention beyond what was provided in the ED, and I believe patient is safe for discharge.  Regardless, an unremarkable evaluation in the ED does not preclude the development or presence of a serious of life threatening condition. As such, patient was instructed to return immediately for any worsening or change in current symptoms. I also discussed the results of my evaluation and diagnostics with patient and he concurs with the evaluation and management plan.  Detailed written and verbal instructions provided to patient and he expressed a verbal understanding of the discharge instructions and overall management plan. Reiterated the importance of medication administration and safety and advised patient to follow up with primary care provider in 3-5 days or sooner if needed.  Also advised patient to return to the ER for any complications.     Regarding HYPERTENSION, I advised patient to: keep a record of blood pressure results; take your blood pressure medication exactly as directed without skipping doses; avoid medications that contain heart stimulants, including over-the-counter drugs such as decongestants; maintain a healthy weight; cut back on sodium intake (i.e., limit canned, dried, packaged, and fast foods and dont add salt to food); follow the DASH (Dietary Approaches to Stop Hypertension) eating plan which recommends vegetables, fruits, whole gains, and other heart healthy foods; begin an exercise program that includes  aerobic exercise 3 to 4 times a week for an average of 40 minutes at a time  [Date of Surgery: ___] : Date of Surgery:   [unfilled] (with approval of cardiologist or primary care provider); limit drinks that contain alcohol and caffeine; control levels of emotional stress; and seek emergency care for any shortness of breath, chest pain, difficulty speaking, confusion, or visual changes.      Regarding KNEE PAIN: Patient instructed to follow prescribed therapy.  I encouraged patient to get plenty of rest, work to obtain/maintain healthy weight and BMI, exercise to improve muscle strength and motion to joint area, protect joint from further injury, and avoid overexerting extremity - especially when stiffness or pain is present. Advised patient to follow up with primary care provider in one week if symptoms are still present or condition worsens.          New Prescriptions    KETOROLAC (TORADOL) 10 MG TABLET    Take 1 tablet (10 mg total) by mouth every 6 to 8 hours as needed for Pain.    TRAMADOL (ULTRAM) 50 MG TABLET    Take 1 tablet (50 mg total) by mouth every 6 (six) hours as needed for Pain.         Follow-up Information     Call  Constantin Mayorga MD.    Specialty:  Family Medicine  Why:  If symptoms worsen or as needed  Contact information:  [address] [facility]  [place] [address]  [phone]                                  Wilfredo Wolff NP  02/18/18 3489     [Surgeon Name:   ___] : Surgeon Name: Dr. OLIVAS [Pre-Op Weight ___] : Pre-op weight was [unfilled] lbs [de-identified] : 73 year old F is s/p Lap band placement, presenting today for routine follow up to "check if lap band is ok". Last band access July 2023 - emptied due to multiple stuck episodes, now pt reports no symptoms. Weight gain since last visit, though pt states overall weight loss as she works with her outside nutritionist. Pt had initially gained weight from life stressors (pt's  underwent CA treatment, now in remission).  Consuming an adequate protein intake with 3 meals/day. Reflux controlled on Pepcid daily. Drinking adequate zero calorie liquid/day ~ >90oz/day. Taking over the counter MVI daily. Exercising 7d/wk - cardio/strength training. Sleeping well. No abdominal pain, reflux, nausea or vomiting. Pt vacillates between constipation and diarrhea (saw GI for workup of pancreatic insufficiency last year).  VE today prior to visit VE 7/28/2023 normal - band emptied at that visit due to multiple stuck episodes

## 2024-08-01 NOTE — PHYSICAL EXAM
[Normal] : affect appropriate [de-identified] : Well, healthy appearing adult [de-identified] : Eyeglasses, EOMI, no conjunctival injection, anicteric  [de-identified] : No visualized masses, JVD or audible bruits noted [de-identified] : Equal chest rise, non-labored respirations, no audible wheezing [de-identified] : Regular rate, no audible carotid bruits or JVD appreciated  [de-identified] : soft, NT, ND, port palpable Left of midline (near abdominoplasty incision) without port site tenderness, erythema or swelling [de-identified] : HAI [de-identified] : in good spirits

## 2024-08-01 NOTE — REVIEW OF SYSTEMS
[Negative] : Allergic/Immunologic [FreeTextEntry7] : vacillates between constipation and diarrhea - taking Miralax QOD to regulate

## 2024-08-12 ENCOUNTER — APPOINTMENT (OUTPATIENT)
Dept: OBGYN | Facility: CLINIC | Age: 74
End: 2024-08-12

## 2024-08-12 VITALS
WEIGHT: 158 LBS | TEMPERATURE: 97.1 F | HEART RATE: 61 BPM | OXYGEN SATURATION: 97 % | HEIGHT: 61 IN | SYSTOLIC BLOOD PRESSURE: 138 MMHG | DIASTOLIC BLOOD PRESSURE: 90 MMHG | BODY MASS INDEX: 29.83 KG/M2

## 2024-08-12 PROCEDURE — 96372 THER/PROPH/DIAG INJ SC/IM: CPT

## 2024-08-16 ENCOUNTER — APPOINTMENT (OUTPATIENT)
Dept: OTOLARYNGOLOGY | Facility: CLINIC | Age: 74
End: 2024-08-16
Payer: MEDICARE

## 2024-08-16 VITALS
DIASTOLIC BLOOD PRESSURE: 78 MMHG | BODY MASS INDEX: 29.83 KG/M2 | HEIGHT: 61 IN | HEART RATE: 66 BPM | SYSTOLIC BLOOD PRESSURE: 117 MMHG | WEIGHT: 158 LBS

## 2024-08-16 DIAGNOSIS — R09.89 OTHER SPECIFIED SYMPTOMS AND SIGNS INVOLVING THE CIRCULATORY AND RESPIRATORY SYSTEMS: ICD-10-CM

## 2024-08-16 DIAGNOSIS — H60.63 UNSPECIFIED CHRONIC OTITIS EXTERNA, BILATERAL: ICD-10-CM

## 2024-08-16 DIAGNOSIS — J31.0 CHRONIC RHINITIS: ICD-10-CM

## 2024-08-16 DIAGNOSIS — K21.9 GASTRO-ESOPHAGEAL REFLUX DISEASE W/OUT ESOPHAGITIS: ICD-10-CM

## 2024-08-16 DIAGNOSIS — J34.2 DEVIATED NASAL SEPTUM: ICD-10-CM

## 2024-08-16 DIAGNOSIS — J38.4 EDEMA OF LARYNX: ICD-10-CM

## 2024-08-16 DIAGNOSIS — L29.9 PRURITUS, UNSPECIFIED: ICD-10-CM

## 2024-08-16 DIAGNOSIS — J38.7 OTHER DISEASES OF LARYNX: ICD-10-CM

## 2024-08-16 PROCEDURE — 92557 COMPREHENSIVE HEARING TEST: CPT

## 2024-08-16 PROCEDURE — 31575 DIAGNOSTIC LARYNGOSCOPY: CPT

## 2024-08-16 PROCEDURE — 92567 TYMPANOMETRY: CPT

## 2024-08-16 PROCEDURE — 99214 OFFICE O/P EST MOD 30 MIN: CPT | Mod: 25

## 2024-08-16 RX ORDER — FAMOTIDINE 40 MG/1
40 TABLET, FILM COATED ORAL
Qty: 30 | Refills: 3 | Status: ACTIVE | COMMUNITY
Start: 2024-08-16 | End: 1900-01-01

## 2024-08-16 NOTE — ASSESSMENT
[FreeTextEntry1] :  Reviewed and reconciled medications, allergies, PMHx, PSHx, SocHx, FMHx.  Pt. with h/o bilateral SNHL, for which she wears amplification, reflux and dizziness - no further dizziness, nasal valve collapse, sleep apnea - uses oral appliance, PND, and reflux. She denies being bother by her reflux. She states that she takes 1 Pepcid a night. She states that she still experiences hoarseness. She denies using hearing aids. She states that she has been getting itchiness in her ears. She states that her nose still runs, but it is improved with Flonase.   Physical exam: -right ear canal: dry, flakey cerumen removed via curettage -left ear canal: dry, hard cerumen removed via curettage -no lateralization to tuning forks -deviated septum left -inflamed turbinates bilaterally -bony exostosis on the mandible and maxilla -no tonsil tissue  ENT Procedure: Flexible laryngoscopy -clear to the nasopharynx -pooling in the pyriforms L>R -vocal cord appear normal -edema of the postcricoid, arytenoids and interarytenoids.  Audio 8/16/24:   Plan:  Audio - results interpreted by Dr. Rolle and reviewed with the patient. -Continue Flonase - 2 sprays bilaterally QD, spray laterally -Continue Pepcid - half pill once a day -FU in 6 months

## 2024-08-16 NOTE — ADDENDUM
[FreeTextEntry1] :  Documented by Maldonado Ramirez acting as scribe for Dr. Rolle on 08/16/2024. All Medical record entries made by the Scribe were at my, Dr. Rolle, direction and personally dictated by me on 08/16/2024 . I have reviewed the chart and agree that the record accurately reflects my personal performance of the history, physical exam, assessment and plan. I have also personally directed, reviewed, and agreed with the discharge instructions.

## 2024-08-16 NOTE — HISTORY OF PRESENT ILLNESS
[de-identified] : Pt. with h/o bilateral SNHL, for which she wears amplification, reflux and dizziness - no further dizziness, nasal valve collapse, sleep apnea - uses oral appliance, PND, and reflux. She denies being bother by her reflux. She states that she takes 1 Pepcid a night. She states that she still experiences hoarseness. She denies using hearing aids. She states that she has been getting itchiness in her ears. She states that her nose still runs, but it is improved with Flonase.

## 2024-08-16 NOTE — PROCEDURE
[Complicated Symptoms] : complicated symptoms requiring more thorough examination than provided by mirror [de-identified] :  Procedure: Flexible Fiberoptic Laryngoscopy: Risks, benefits, and alternatives of flexible endoscopy were explained to the patient. The patient gave oral consent to proceed. The flexible scope was inserted into the right nasal cavity and advanced towards the nasopharynx. Visualized mucosa over the turbinates and septum were as described above. The nasopharynx was clear. Oropharyngeal walls were symmetric and mobile without lesion, mass, or edema. Hypopharynx was also without lesion or edema. Larynx was mobile without lesions. Supraglottic structures were free of mass and asymmetry, but edema of the postcricoid, arytenoids and interarytenoids. True vocal folds were white without mass or lesion. Base of tongue was within normal limits. There was pooling in the pyriforms L>R. -clear to the nasopharynx -pooling in the pyriforms L>R -vocal cord appear normal -edema of the postcricoid, arytenoids and interarytenoids.

## 2024-08-16 NOTE — PHYSICAL EXAM
[Hearing Sandoval Test (Tuning Fork On Forehead)] : no lateralization of tone [] : septum deviated to the left [Midline] : trachea located in midline position [Normal] : no rashes [Hearing Loss Left Only] : normal [Hearing Loss Right Only] : normal [FreeTextEntry8] : dry, flakey cerumen removed via curettage [FreeTextEntry9] :  dry, hard cerumen removed via curettage [de-identified] : inflamed turbinates bilaterally [de-identified] : no tonsil tissue [de-identified] : bony exostosis on the mandible and maxilla [FreeTextEntry2] :  sinuses nontender to percussion.  sensations intact

## 2024-08-21 ENCOUNTER — APPOINTMENT (OUTPATIENT)
Dept: ORTHOPEDIC SURGERY | Facility: CLINIC | Age: 74
End: 2024-08-21
Payer: MEDICARE

## 2024-08-21 VITALS
HEIGHT: 61 IN | HEART RATE: 57 BPM | SYSTOLIC BLOOD PRESSURE: 140 MMHG | DIASTOLIC BLOOD PRESSURE: 85 MMHG | BODY MASS INDEX: 29.45 KG/M2 | WEIGHT: 156 LBS

## 2024-08-21 DIAGNOSIS — M43.10 SPONDYLOLISTHESIS, SITE UNSPECIFIED: ICD-10-CM

## 2024-08-21 DIAGNOSIS — M48.062 SPINAL STENOSIS, LUMBAR REGION WITH NEUROGENIC CLAUDICATION: ICD-10-CM

## 2024-08-21 DIAGNOSIS — M51.37 OTHER INTERVERTEBRAL DISC DEGENERATION, LUMBOSACRAL REGION: ICD-10-CM

## 2024-08-21 DIAGNOSIS — Z87.39 PERSONAL HISTORY OF OTHER DISEASES OF THE MUSCULOSKELETAL SYSTEM AND CONNECTIVE TISSUE: ICD-10-CM

## 2024-08-21 DIAGNOSIS — M54.16 RADICULOPATHY, LUMBAR REGION: ICD-10-CM

## 2024-08-21 PROCEDURE — 99204 OFFICE O/P NEW MOD 45 MIN: CPT

## 2024-08-21 PROCEDURE — 72110 X-RAY EXAM L-2 SPINE 4/>VWS: CPT

## 2024-08-21 PROCEDURE — 72170 X-RAY EXAM OF PELVIS: CPT

## 2024-08-21 RX ORDER — GABAPENTIN 100 MG/1
100 CAPSULE ORAL
Qty: 30 | Refills: 2 | Status: ACTIVE | COMMUNITY
Start: 2024-08-21 | End: 1900-01-01

## 2024-08-21 NOTE — HISTORY OF PRESENT ILLNESS
[4] : a current pain level of 4/10 [Exercise Regimen] : relieved by exercise regimen [de-identified] : Patient presents today with low back pain that radiates down the back of her legs. She said about 2 months ago she woke up with the pain. She denies any numbness and states there was no injury. She did 4 weeks of physical therapy but the pain got worse so she stopped.   - Summary : Alissa has been experiencing pain in the back of her hips and legs since June 2021 that seems to worsen over time. She attempted physical therapy as suggested by her PCP, however, it aggravated her condition. She also reported skipping some of her usual exercise classes due to the pain, which is unusual for her. - Chief Complaint (CC) : Back and leg pain since June 2021. - History of Present Illness (HPI) : Alissa is an elderly woman who has been experiencing consistent pain in the back of her hips and legs since June. The pain sometimes affects one leg, sometimes the other, and sometimes causes a pulsing ache in both hips. It's been getting progressively worse, affecting Alissa's activity levels. She previously attended physical therapy, but it seemed to worsen her symptoms. She reports that she hasn't had this pain before and hasn't been suggested any medications for her pain. She also had both hips and knee replaced in the past and one hip had a revision. - Past Medical History : Alissa's medical history includes hip and knee replacements, gastric lap band, and an instance of sudden leg weakness about a year ago with no apparent cause. [de-identified] : getting out of bed and getting up from a sitting position.

## 2024-08-21 NOTE — PHYSICAL EXAM
[de-identified] :  Patient exhibits a stoop forward posture, slightly wide base gait. She can forward flex to the floor without pain; extension is 30 degrees with back pain and leg pain. No tenderness in the midline lumbar or paraspinal lumbar musculature. Bilateral heel-knee incision, bilateral hip incisions visible with absent knee joint, plus one ankle jerk reflex bilaterally. Strength 5 out of 5 in knee extension, hip flexion, ankle dorsiflexion, plantar flexion strength bilaterally. Negative straight leg raise bilaterally.  Grossly intact light touch sensation bilateral lower extremities [de-identified] : Four views lumbar spine and X-Rays of hips taken demonstrate bilateral hip arthroplasty implants, prior lap band implant, minimal left side lumbar curve, loss of disc at L5S1, grade 2 spondylolisthesis at L4-5 and grade 1 at L3-4. No fractures seen, but chronic severe inflamed compression visible at T12 or T10 body. On flexion, antralisthesis measures approximately 13 millimeters at L4-5 and 3 millimeters at L3-4. On extension, antralisthesis measures 11 millimeters at L4-5 and 3 millimeters at L5-S1.   AP pelvis shows bilateral hip arthroplasty implants and metallic clips in the pelvis.

## 2024-08-21 NOTE — DISCUSSION/SUMMARY
[Medication Risks Reviewed] : Medication risks reviewed [de-identified] : Assessment: - Summary : Based on the examination and the patient's symptoms, it's clear that Alissa has a diagnosis of spondylolisthesis at L4-5 as well as L3-4 with associated spinal stenosis and lumbar radiculopathy . - Problems : - Spondylolisthesis  - Differential Diagnosis : - Spondylosis  - Spondylolisthesis  - Degenerative disc disease  - Spinal stenosis Lumbar radiculopathy  Plan: - Summary : Recommended an MRI to further evaluate the nerve compression. Plan includes potentially prescribing medication (Gabapentin) alongside possible physical therapy and an epidural steroid injection if necessary. All treatments are optional and dependent on Alissa's preferences and the severity of her condition as assessed by the MRI results. - Plan : - Order an MRI of the lumbar spine  - Prescribed Gabapentin to calm down any irritated nerves, trial of 100 mg nightly  - Referral for physical therapy prescribed  - Considering an epidural steroid injection after the MRI bilateral L4 transforaminal

## 2024-09-14 ENCOUNTER — APPOINTMENT (OUTPATIENT)
Dept: MRI IMAGING | Facility: CLINIC | Age: 74
End: 2024-09-14

## 2024-09-14 ENCOUNTER — OUTPATIENT (OUTPATIENT)
Dept: OUTPATIENT SERVICES | Facility: HOSPITAL | Age: 74
LOS: 1 days | End: 2024-09-14
Payer: MEDICARE

## 2024-09-14 DIAGNOSIS — M48.062 SPINAL STENOSIS, LUMBAR REGION WITH NEUROGENIC CLAUDICATION: ICD-10-CM

## 2024-09-14 DIAGNOSIS — Z96.643 PRESENCE OF ARTIFICIAL HIP JOINT, BILATERAL: Chronic | ICD-10-CM

## 2024-09-14 DIAGNOSIS — Z98.84 BARIATRIC SURGERY STATUS: Chronic | ICD-10-CM

## 2024-09-14 DIAGNOSIS — Z98.890 OTHER SPECIFIED POSTPROCEDURAL STATES: Chronic | ICD-10-CM

## 2024-09-14 DIAGNOSIS — M51.37 OTHER INTERVERTEBRAL DISC DEGENERATION, LUMBOSACRAL REGION: ICD-10-CM

## 2024-09-14 DIAGNOSIS — M54.16 RADICULOPATHY, LUMBAR REGION: ICD-10-CM

## 2024-09-14 DIAGNOSIS — M43.10 SPONDYLOLISTHESIS, SITE UNSPECIFIED: ICD-10-CM

## 2024-09-14 DIAGNOSIS — Z96.651 PRESENCE OF RIGHT ARTIFICIAL KNEE JOINT: Chronic | ICD-10-CM

## 2024-09-14 DIAGNOSIS — Z90.710 ACQUIRED ABSENCE OF BOTH CERVIX AND UTERUS: Chronic | ICD-10-CM

## 2024-09-14 PROCEDURE — 72148 MRI LUMBAR SPINE W/O DYE: CPT

## 2024-09-14 PROCEDURE — 72148 MRI LUMBAR SPINE W/O DYE: CPT | Mod: 26,MH

## 2024-09-30 ENCOUNTER — APPOINTMENT (OUTPATIENT)
Dept: ORTHOPEDIC SURGERY | Facility: CLINIC | Age: 74
End: 2024-09-30
Payer: MEDICARE

## 2024-09-30 VITALS — BODY MASS INDEX: 30.21 KG/M2 | HEIGHT: 61 IN | WEIGHT: 160 LBS

## 2024-09-30 DIAGNOSIS — M48.062 SPINAL STENOSIS, LUMBAR REGION WITH NEUROGENIC CLAUDICATION: ICD-10-CM

## 2024-09-30 DIAGNOSIS — M43.10 SPONDYLOLISTHESIS, SITE UNSPECIFIED: ICD-10-CM

## 2024-09-30 DIAGNOSIS — M54.16 RADICULOPATHY, LUMBAR REGION: ICD-10-CM

## 2024-09-30 DIAGNOSIS — M51.37 OTHER INTERVERTEBRAL DISC DEGENERATION, LUMBOSACRAL REGION: ICD-10-CM

## 2024-09-30 PROCEDURE — 99214 OFFICE O/P EST MOD 30 MIN: CPT

## 2024-09-30 NOTE — PHYSICAL EXAM
[de-identified] :  Patient exhibits a stoop forward posture, slightly wide base gait. She can forward flex to the floor without pain; extension is 30 degrees with back pain and leg pain. No tenderness in the midline lumbar or paraspinal lumbar musculature. Bilateral heel-knee incision, bilateral hip incisions visible with absent knee joint, plus one ankle jerk reflex bilaterally. Strength 5 out of 5 in knee extension, hip flexion, ankle dorsiflexion, plantar flexion strength bilaterally. Negative straight leg raise bilaterally.  Grossly intact light touch sensation bilateral lower extremities [de-identified] : EXAM: [id number] - MR SPINE LUMBAR - ORDERED BY: LESLIE GAMBINO  PROCEDURE DATE: 09/14/2024  INTERPRETATION: MR LUMBAR SPINE WITHOUT CONTRAST  TECHNIQUE: Multiplanar multisequence imaging of the lumbar spine was performed without the administration of intravenous contrast.  CLINICAL INDICATION: Lumbar radiculopathy. Spondylolisthesis. Pain down legs. COMPARISON: None. ____________________ FINDINGS: Mildly degraded by motion artifact. L5-S1 is at image 41 of series 8. There are 5 lumbar type vertebral bodies.  Mild lumbar levoscoliosis with an apex near L3-L4. Mild upper lumbar dextroscoliosis with an apex near L1. Very mild right lateral subluxation of L2 on L3 and of L1 on L2. Grade 1 anterolisthesis of L4 on L5. Grade 1 anterolisthesis of L3 on L4. Mild retrolisthesis of L1 on L2. Very mild retrolisthesis of T12 on L1. Lumbar alignment otherwise maintained.  Visualized vertebral body heights are maintained. No acute fractures. No aggressive osseous lesions. Multilevel endplate degenerative changes.  Conus has a normal appearance and terminates at L1.  Decreased T2 signal of the lumbar disks compatible with degenerative disc disease.  T12-L1: Disc bulge. Disc height loss. Very mild spinal canal stenosis. No significant left neuroforaminal stenosis. No significant right neuroforaminal stenosis. Moderate hypertrophic facet joint degeneration.  L1-L2: Disc bulge. Disc height loss. Mild spinal canal stenosis. Mild to moderate left neuroforaminal stenosis. No significant right neuroforaminal stenosis. Moderate hypertrophic facet joint degeneration.  L2-L3: Disc bulge. No significant disc height loss. Mild to moderate spinal canal stenosis. No significant left neuroforaminal stenosis. No significant right neuroforaminal stenosis. Severe hypertrophic facet joint degeneration with hypertrophy of the ligamentum flavum.  L3-L4: Disc bulge. No significant disc height loss. Severe spinal canal stenosis. Mild left neuroforaminal stenosis. Mild right neuroforaminal stenosis. Severe hypertrophic facet joint degeneration with hypertrophy of the ligamentum flavum.  L4-L5: Grade 1 anterolisthesis of L4 on L5 with uncovering of the posterior margin of the disc. Disc bulge. No significant disc height loss. Severe spinal canal stenosis. Severe left neuroforaminal stenosis. Severe right neuroforaminal stenosis. Severe hypertrophic facet joint degeneration with hypertrophy of ligamentum flavum and bilateral facet joint effusions  L5-S1: Disc bulge. Mild disc height loss. No significant spinal canal stenosis. Mild left neuroforaminal stenosis. Mild right neuroforaminal stenosis. Mild hypertrophic facet joint degeneration. Annular fissure.  Paraspinal Soft Tissues/Retroperitoneum: Benign-appearing left renal cysts. ____________________ IMPRESSION:  1. Scoliosis and multilevel spondylolisthesis as detailed above. 2. Multilevel lumbar spondylosis as detailed above with at most severe spinal canal stenosis and severe neuroforaminal stenosis.  --- End of Report ---  MASHA ARROYO MD; Attending Radiologist This document has been electronically signed. Sep 17 2024 11:34PM

## 2024-09-30 NOTE — HISTORY OF PRESENT ILLNESS
[7] : a current pain level of 7/10 [Daily] : ~He/She~ states the symptoms seem to be occuring daily [de-identified] : Patient is here today to review mri lumbar spine 9/14/2024.  - Summary : Patient reported persistent pain in the back and legs with no improvement since the last visit, indicating chronic discomfort. Previous treatment included a low dose of Gabapentin, to which the patient reported no major side effects but also no relief. - Chief Complaint (CC) : Persistent back and leg pain. - History of Present Illness (HPI) : The patient is a 73-year-old, presenting with severe back and leg pain, associated with numbness and weakness in legs leading to difficulty in walking. The condition remained the same since the first visit. - Past Medical History : Patient has a history of psychiatric treatment and joint replacement surgeries including two knees and two hips. - Past Surgical History : The patient has undergone two knee and two hip replacements. [de-identified] : in the am and end of the day

## 2024-09-30 NOTE — DISCUSSION/SUMMARY
[Medication Risks Reviewed] : Medication risks reviewed [Surgical risks reviewed] : Surgical risks reviewed [de-identified] : Assessment: - Summary : Patient has severe lumbar spinal stenosis at L4-5 which is pinching his nerves and causing chronic discomfort. - Problems : - Severe Lumbar Spinal Stenosis with spondylolisthesis L4-5 and stenosis L3-4  - Differential Diagnosis : - Disc Herniation  - Sciatica  Plan: - Summary : Recommended surgical intervention in the form of a laminectomy and fusion due to the severity of the condition. However, as a short term measure before surgery, an epidural steroid injection might be beneficial to alleviate some pain. - Plan : - Laminectomy and fusion surgery L3-L5  - Possible Epidural steroid injection bilateral L4 to she wants to avoid surgery.

## 2024-10-11 ENCOUNTER — NON-APPOINTMENT (OUTPATIENT)
Age: 74
End: 2024-10-11

## 2024-10-14 ENCOUNTER — OUTPATIENT (OUTPATIENT)
Dept: OUTPATIENT SERVICES | Facility: HOSPITAL | Age: 74
LOS: 1 days | End: 2024-10-14
Payer: MEDICARE

## 2024-10-14 VITALS
RESPIRATION RATE: 15 BRPM | WEIGHT: 160.06 LBS | HEART RATE: 62 BPM | HEIGHT: 61 IN | TEMPERATURE: 99 F | OXYGEN SATURATION: 97 % | DIASTOLIC BLOOD PRESSURE: 72 MMHG | SYSTOLIC BLOOD PRESSURE: 105 MMHG

## 2024-10-14 DIAGNOSIS — Z96.651 PRESENCE OF RIGHT ARTIFICIAL KNEE JOINT: Chronic | ICD-10-CM

## 2024-10-14 DIAGNOSIS — M54.16 RADICULOPATHY, LUMBAR REGION: ICD-10-CM

## 2024-10-14 DIAGNOSIS — Z96.652 PRESENCE OF LEFT ARTIFICIAL KNEE JOINT: Chronic | ICD-10-CM

## 2024-10-14 DIAGNOSIS — Z01.818 ENCOUNTER FOR OTHER PREPROCEDURAL EXAMINATION: ICD-10-CM

## 2024-10-14 DIAGNOSIS — Z98.890 OTHER SPECIFIED POSTPROCEDURAL STATES: Chronic | ICD-10-CM

## 2024-10-14 DIAGNOSIS — Z96.643 PRESENCE OF ARTIFICIAL HIP JOINT, BILATERAL: Chronic | ICD-10-CM

## 2024-10-14 DIAGNOSIS — Z90.710 ACQUIRED ABSENCE OF BOTH CERVIX AND UTERUS: Chronic | ICD-10-CM

## 2024-10-14 DIAGNOSIS — M43.10 SPONDYLOLISTHESIS, SITE UNSPECIFIED: ICD-10-CM

## 2024-10-14 DIAGNOSIS — Z98.84 BARIATRIC SURGERY STATUS: Chronic | ICD-10-CM

## 2024-10-14 DIAGNOSIS — Z98.84 BARIATRIC SURGERY STATUS: ICD-10-CM

## 2024-10-14 DIAGNOSIS — Z96.649 PRESENCE OF UNSPECIFIED ARTIFICIAL HIP JOINT: Chronic | ICD-10-CM

## 2024-10-14 DIAGNOSIS — M48.062 SPINAL STENOSIS, LUMBAR REGION WITH NEUROGENIC CLAUDICATION: ICD-10-CM

## 2024-10-14 PROBLEM — Z97.4 PRESENCE OF EXTERNAL HEARING-AID: Chronic | Status: INACTIVE | Noted: 2021-07-07 | Resolved: 2024-10-14

## 2024-10-14 LAB
ALBUMIN SERPL ELPH-MCNC: 3.8 G/DL — SIGNIFICANT CHANGE UP (ref 3.3–5)
ALP SERPL-CCNC: 69 U/L — SIGNIFICANT CHANGE UP (ref 30–120)
ALT FLD-CCNC: 26 U/L — SIGNIFICANT CHANGE UP (ref 10–60)
ANION GAP SERPL CALC-SCNC: 7 MMOL/L — SIGNIFICANT CHANGE UP (ref 5–17)
APTT BLD: 27.3 SEC — SIGNIFICANT CHANGE UP (ref 24.5–35.6)
AST SERPL-CCNC: 21 U/L — SIGNIFICANT CHANGE UP (ref 10–40)
BILIRUB SERPL-MCNC: 0.3 MG/DL — SIGNIFICANT CHANGE UP (ref 0.2–1.2)
BUN SERPL-MCNC: 31 MG/DL — HIGH (ref 7–23)
CALCIUM SERPL-MCNC: 10.1 MG/DL — SIGNIFICANT CHANGE UP (ref 8.4–10.5)
CHLORIDE SERPL-SCNC: 103 MMOL/L — SIGNIFICANT CHANGE UP (ref 96–108)
CO2 SERPL-SCNC: 29 MMOL/L — SIGNIFICANT CHANGE UP (ref 22–31)
CREAT SERPL-MCNC: 1.22 MG/DL — SIGNIFICANT CHANGE UP (ref 0.5–1.3)
EGFR: 47 ML/MIN/1.73M2 — LOW
GLUCOSE SERPL-MCNC: 103 MG/DL — HIGH (ref 70–99)
INR BLD: 1.01 RATIO — SIGNIFICANT CHANGE UP (ref 0.85–1.16)
POTASSIUM SERPL-MCNC: 3.6 MMOL/L — SIGNIFICANT CHANGE UP (ref 3.5–5.3)
POTASSIUM SERPL-SCNC: 3.6 MMOL/L — SIGNIFICANT CHANGE UP (ref 3.5–5.3)
PROT SERPL-MCNC: 7.3 G/DL — SIGNIFICANT CHANGE UP (ref 6–8.3)
PROTHROM AB SERPL-ACNC: 11.7 SEC — SIGNIFICANT CHANGE UP (ref 9.9–13.4)
SODIUM SERPL-SCNC: 139 MMOL/L — SIGNIFICANT CHANGE UP (ref 135–145)

## 2024-10-14 NOTE — H&P PST ADULT - NSICDXPASTSURGICALHX_GEN_ALL_CORE_FT
PAST SURGICAL HISTORY:  H/O bariatric surgery lap band 2003    H/O bilateral hip replacements Left 2004 Right 20019    H/O hernia repair Umbilical 2009    History of abdominoplasty Panniculectomy    History of hysterectomy 1995    S/P revision of total hip     S/P total knee replacement, left     S/P total knee replacement, right 2019

## 2024-10-14 NOTE — H&P PST ADULT - NSICDXPASTMEDICALHX_GEN_ALL_CORE_FT
PAST MEDICAL HISTORY:  Cardiac arrhythmia     Depression     Gum hypertrophy     H/O osteoporosis     H/O sleep apnea     H/O urinary incontinence     History of hypothyroidism     HLD (hyperlipidemia)     [place] (hard of hearing)     Hypertension     Internal bleeding     Morbid obesity s/p gastric lap    OA (osteoarthritis)

## 2024-10-14 NOTE — H&P PST ADULT - NSICDXPROCEDURE_GEN_ALL_CORE_FT
PROCEDURES:  Laminectomy, spine, lumbar, 3 levels 14-Oct-2024 12:41:30 lumbar laminectomy L3-5, posterior spinal fusion l3-5, posterior spinal instrumentation L3-5, autograft, allograft [facility]

## 2024-10-14 NOTE — H&P PST ADULT - PROBLEM SELECTOR PLAN 2
patient had lap band deflated for surgery 2021 and never had it reinflated; she had a recent visit with Dr Reyes; need note from her

## 2024-10-14 NOTE — H&P PST ADULT - HISTORY OF PRESENT ILLNESS
this is a 74 y/o female who has had shooting pain of legs and low back pain for about 2 months, tests show pinched nerves; to have lumbar laminectomy, fusion and other procedures

## 2024-10-14 NOTE — H&P PST ADULT - PROBLEM SELECTOR PLAN 1
lumbar laminectomy L3-5, posterior spinal fusion L3-5, posterior spinal instrumentation L3-5, autograft, allograft; preop instructions given; went for medical clearance, to go for cardiac clearance, ekg done by cardio

## 2024-10-14 NOTE — H&P PST ADULT - NSICDXFAMILYHX_GEN_ALL_CORE_FT
FAMILY HISTORY:  Father  Still living? No  FH: CHF (congestive heart failure), Age at diagnosis: Age Unknown    Mother  Still living? No  FH: chronic kidney disease, Age at diagnosis: Age Unknown    Sibling  Still living? Yes, Estimated age: 71-80  FH: chronic kidney disease, Age at diagnosis: Age Unknown

## 2024-10-28 PROBLEM — Z87.39 PERSONAL HISTORY OF OTHER DISEASES OF THE MUSCULOSKELETAL SYSTEM AND CONNECTIVE TISSUE: Chronic | Status: ACTIVE | Noted: 2024-10-14

## 2024-10-28 PROBLEM — K06.1 GINGIVAL ENLARGEMENT: Chronic | Status: ACTIVE | Noted: 2024-10-14

## 2024-10-28 PROBLEM — Z87.898 PERSONAL HISTORY OF OTHER SPECIFIED CONDITIONS: Chronic | Status: ACTIVE | Noted: 2024-10-14

## 2024-10-28 PROBLEM — Z86.69 PERSONAL HISTORY OF OTHER DISEASES OF THE NERVOUS SYSTEM AND SENSE ORGANS: Chronic | Status: ACTIVE | Noted: 2024-10-14

## 2024-10-28 PROBLEM — I49.9 CARDIAC ARRHYTHMIA, UNSPECIFIED: Chronic | Status: ACTIVE | Noted: 2024-10-14

## 2024-10-28 PROBLEM — R58 HEMORRHAGE, NOT ELSEWHERE CLASSIFIED: Chronic | Status: ACTIVE | Noted: 2024-10-14

## 2024-10-28 NOTE — PROVIDER CONTACT NOTE (OTHER) - ASSESSMENT
The spine pre-operative education packet was mailed to the patient on 10/14/24. The patient reviewed the information included in the packet. She called the office and reviewed the information with the NP. All her questions were answered, and she gave a clear understanding of the instructions. She was advised to call the office at any time with further questions or concerns.

## 2024-10-29 ENCOUNTER — TRANSCRIPTION ENCOUNTER (OUTPATIENT)
Age: 74
End: 2024-10-29

## 2024-10-29 ENCOUNTER — RESULT REVIEW (OUTPATIENT)
Age: 74
End: 2024-10-29

## 2024-10-29 ENCOUNTER — APPOINTMENT (OUTPATIENT)
Dept: ORTHOPEDIC SURGERY | Facility: HOSPITAL | Age: 74
End: 2024-10-29

## 2024-10-29 ENCOUNTER — INPATIENT (INPATIENT)
Facility: HOSPITAL | Age: 74
LOS: 2 days | Discharge: NURSING, REHAB OR LONG-TERM CARE | DRG: 448 | End: 2024-11-01
Attending: ORTHOPAEDIC SURGERY | Admitting: ORTHOPAEDIC SURGERY
Payer: MEDICARE

## 2024-10-29 VITALS
SYSTOLIC BLOOD PRESSURE: 136 MMHG | HEART RATE: 58 BPM | DIASTOLIC BLOOD PRESSURE: 71 MMHG | TEMPERATURE: 98 F | RESPIRATION RATE: 23 BRPM | HEIGHT: 61 IN | WEIGHT: 158.29 LBS | OXYGEN SATURATION: 100 %

## 2024-10-29 DIAGNOSIS — Z96.651 PRESENCE OF RIGHT ARTIFICIAL KNEE JOINT: Chronic | ICD-10-CM

## 2024-10-29 DIAGNOSIS — Z96.649 PRESENCE OF UNSPECIFIED ARTIFICIAL HIP JOINT: Chronic | ICD-10-CM

## 2024-10-29 DIAGNOSIS — Z98.84 BARIATRIC SURGERY STATUS: Chronic | ICD-10-CM

## 2024-10-29 DIAGNOSIS — Z98.890 OTHER SPECIFIED POSTPROCEDURAL STATES: Chronic | ICD-10-CM

## 2024-10-29 DIAGNOSIS — Z90.710 ACQUIRED ABSENCE OF BOTH CERVIX AND UTERUS: Chronic | ICD-10-CM

## 2024-10-29 DIAGNOSIS — M54.16 RADICULOPATHY, LUMBAR REGION: ICD-10-CM

## 2024-10-29 DIAGNOSIS — Z96.652 PRESENCE OF LEFT ARTIFICIAL KNEE JOINT: Chronic | ICD-10-CM

## 2024-10-29 DIAGNOSIS — M48.062 SPINAL STENOSIS, LUMBAR REGION WITH NEUROGENIC CLAUDICATION: ICD-10-CM

## 2024-10-29 DIAGNOSIS — Z96.643 PRESENCE OF ARTIFICIAL HIP JOINT, BILATERAL: Chronic | ICD-10-CM

## 2024-10-29 PROCEDURE — 22614 ARTHRD PST TQ 1NTRSPC EA ADD: CPT | Mod: 82

## 2024-10-29 PROCEDURE — 22614 ARTHRD PST TQ 1NTRSPC EA ADD: CPT

## 2024-10-29 PROCEDURE — 22612 ARTHRD PST TQ 1NTRSPC LUMBAR: CPT | Mod: 82

## 2024-10-29 PROCEDURE — 22842 INSERT SPINE FIXATION DEVICE: CPT | Mod: 82

## 2024-10-29 PROCEDURE — 22612 ARTHRD PST TQ 1NTRSPC LUMBAR: CPT

## 2024-10-29 PROCEDURE — 20937 SP BONE AGRFT MORSEL ADD-ON: CPT

## 2024-10-29 PROCEDURE — 99222 1ST HOSP IP/OBS MODERATE 55: CPT

## 2024-10-29 PROCEDURE — 88311 DECALCIFY TISSUE: CPT | Mod: 26

## 2024-10-29 PROCEDURE — 63267 EXCISE INTRSPINL LESION LMBR: CPT | Mod: 82,59

## 2024-10-29 PROCEDURE — 88304 TISSUE EXAM BY PATHOLOGIST: CPT | Mod: 26

## 2024-10-29 PROCEDURE — 63267 EXCISE INTRSPINL LESION LMBR: CPT | Mod: 59

## 2024-10-29 PROCEDURE — 22842 INSERT SPINE FIXATION DEVICE: CPT

## 2024-10-29 DEVICE — SET SCREW TI 5.5/6MM: Type: IMPLANTABLE DEVICE | Status: FUNCTIONAL

## 2024-10-29 DEVICE — SCREW MAS 6.5X45MM: Type: IMPLANTABLE DEVICE | Status: FUNCTIONAL

## 2024-10-29 DEVICE — SCREW MAS 6.5X40MM: Type: IMPLANTABLE DEVICE | Status: FUNCTIONAL

## 2024-10-29 DEVICE — SCREW SOLERA 5.5X45MM: Type: IMPLANTABLE DEVICE | Status: FUNCTIONAL

## 2024-10-29 DEVICE — SURGIFOAM PAD 8CM X 12.5CM X 10MM (100): Type: IMPLANTABLE DEVICE | Status: FUNCTIONAL

## 2024-10-29 DEVICE — BONE WAX 2.5GM: Type: IMPLANTABLE DEVICE | Status: FUNCTIONAL

## 2024-10-29 DEVICE — ROD TI 60MM: Type: IMPLANTABLE DEVICE | Status: FUNCTIONAL

## 2024-10-29 DEVICE — SURGIFLO HEMOSTATIC MATRIX KIT: Type: IMPLANTABLE DEVICE | Status: FUNCTIONAL

## 2024-10-29 RX ORDER — APREPITANT 40 MG/1
40 CAPSULE ORAL ONCE
Refills: 0 | Status: COMPLETED | OUTPATIENT
Start: 2024-10-29 | End: 2024-10-29

## 2024-10-29 RX ORDER — CEFAZOLIN SODIUM 1 G
2000 VIAL (EA) INJECTION EVERY 8 HOURS
Refills: 0 | Status: COMPLETED | OUTPATIENT
Start: 2024-10-29 | End: 2024-10-30

## 2024-10-29 RX ORDER — SENNA 187 MG
2 TABLET ORAL AT BEDTIME
Refills: 0 | Status: DISCONTINUED | OUTPATIENT
Start: 2024-10-29 | End: 2024-11-01

## 2024-10-29 RX ORDER — PANCRELIPASE 16800; 98400; 56800 [USP'U]/1; [USP'U]/1; [USP'U]/1
2 CAPSULE, DELAYED RELEASE ORAL
Refills: 0 | Status: DISCONTINUED | OUTPATIENT
Start: 2024-10-29 | End: 2024-11-01

## 2024-10-29 RX ORDER — CEFAZOLIN SODIUM 1 G
2000 VIAL (EA) INJECTION ONCE
Refills: 0 | Status: COMPLETED | OUTPATIENT
Start: 2024-10-29 | End: 2024-10-29

## 2024-10-29 RX ORDER — ACETAMINOPHEN 500 MG
1000 TABLET ORAL EVERY 8 HOURS
Refills: 0 | Status: DISCONTINUED | OUTPATIENT
Start: 2024-10-29 | End: 2024-11-01

## 2024-10-29 RX ORDER — HYDROMORPHONE HCL/0.9% NACL/PF 6 MG/30 ML
0.5 PATIENT CONTROLLED ANALGESIA SYRINGE INTRAVENOUS
Refills: 0 | Status: DISCONTINUED | OUTPATIENT
Start: 2024-10-29 | End: 2024-10-29

## 2024-10-29 RX ORDER — METOPROLOL TARTRATE 50 MG
50 TABLET ORAL DAILY
Refills: 0 | Status: DISCONTINUED | OUTPATIENT
Start: 2024-10-29 | End: 2024-11-01

## 2024-10-29 RX ORDER — METOPROLOL TARTRATE 50 MG
1 TABLET ORAL
Refills: 0 | DISCHARGE

## 2024-10-29 RX ORDER — ACETAMINOPHEN 500 MG
1000 TABLET ORAL ONCE
Refills: 0 | Status: COMPLETED | OUTPATIENT
Start: 2024-10-29 | End: 2024-10-29

## 2024-10-29 RX ORDER — HYDROMORPHONE HCL/0.9% NACL/PF 6 MG/30 ML
0.5 PATIENT CONTROLLED ANALGESIA SYRINGE INTRAVENOUS
Refills: 0 | Status: DISCONTINUED | OUTPATIENT
Start: 2024-10-29 | End: 2024-11-01

## 2024-10-29 RX ORDER — MAGNESIUM HYDROXIDE 1200 MG/15ML
30 SUSPENSION ORAL EVERY 12 HOURS
Refills: 0 | Status: DISCONTINUED | OUTPATIENT
Start: 2024-10-29 | End: 2024-11-01

## 2024-10-29 RX ORDER — OXYCODONE HYDROCHLORIDE 30 MG/1
10 TABLET ORAL
Refills: 0 | Status: DISCONTINUED | OUTPATIENT
Start: 2024-10-29 | End: 2024-11-01

## 2024-10-29 RX ORDER — HYDROMORPHONE HCL/0.9% NACL/PF 6 MG/30 ML
0.2 PATIENT CONTROLLED ANALGESIA SYRINGE INTRAVENOUS
Refills: 0 | Status: DISCONTINUED | OUTPATIENT
Start: 2024-10-29 | End: 2024-10-29

## 2024-10-29 RX ORDER — FLUTICASONE PROPIONATE 220 MCG
2 AEROSOL WITH ADAPTER (GRAM) INHALATION
Refills: 0 | DISCHARGE

## 2024-10-29 RX ORDER — SERTRALINE HYDROCHLORIDE 50 MG/1
150 TABLET, FILM COATED ORAL DAILY
Refills: 0 | Status: DISCONTINUED | OUTPATIENT
Start: 2024-10-29 | End: 2024-11-01

## 2024-10-29 RX ORDER — MAGNESIUM, ALUMINUM HYDROXIDE 200-200 MG
30 TABLET,CHEWABLE ORAL EVERY 12 HOURS
Refills: 0 | Status: DISCONTINUED | OUTPATIENT
Start: 2024-10-29 | End: 2024-11-01

## 2024-10-29 RX ORDER — FAMOTIDINE 10 MG/ML
20 INJECTION INTRAVENOUS DAILY
Refills: 0 | Status: DISCONTINUED | OUTPATIENT
Start: 2024-10-29 | End: 2024-11-01

## 2024-10-29 RX ORDER — TRANEXAMIC ACID 650 MG/1
2000 TABLET ORAL ONCE
Refills: 0 | Status: COMPLETED | OUTPATIENT
Start: 2024-10-29 | End: 2024-10-29

## 2024-10-29 RX ORDER — POLYETHYLENE GLYCOL 3350 17 G/17G
17 POWDER, FOR SOLUTION ORAL DAILY
Refills: 0 | Status: DISCONTINUED | OUTPATIENT
Start: 2024-10-29 | End: 2024-11-01

## 2024-10-29 RX ORDER — ONDANSETRON HYDROCHLORIDE 2 MG/ML
4 INJECTION, SOLUTION INTRAMUSCULAR; INTRAVENOUS EVERY 6 HOURS
Refills: 0 | Status: DISCONTINUED | OUTPATIENT
Start: 2024-10-29 | End: 2024-11-01

## 2024-10-29 RX ORDER — BUPROPION HCL 150 MG
300 TABLET,SUSTAINED-RELEASE 12 HR ORAL DAILY
Refills: 0 | Status: DISCONTINUED | OUTPATIENT
Start: 2024-10-29 | End: 2024-11-01

## 2024-10-29 RX ORDER — CYCLOBENZAPRINE HYDROCHLORIDE 30 MG/1
10 CAPSULE, EXTENDED RELEASE ORAL EVERY 8 HOURS
Refills: 0 | Status: DISCONTINUED | OUTPATIENT
Start: 2024-10-29 | End: 2024-11-01

## 2024-10-29 RX ORDER — ONDANSETRON HYDROCHLORIDE 2 MG/ML
4 INJECTION, SOLUTION INTRAMUSCULAR; INTRAVENOUS ONCE
Refills: 0 | Status: DISCONTINUED | OUTPATIENT
Start: 2024-10-29 | End: 2024-10-29

## 2024-10-29 RX ORDER — PANCRELIPASE 8000; 30250; 28750 [USP'U]/1; [USP'U]/1; [USP'U]/1
2 CAPSULE, DELAYED RELEASE ORAL
Refills: 0 | DISCHARGE

## 2024-10-29 RX ORDER — DENOSUMAB 120 MG/1.7ML
60 INJECTION SUBCUTANEOUS
Refills: 0 | DISCHARGE

## 2024-10-29 RX ORDER — FLUTICASONE PROPIONATE 50 UG/1
1 SPRAY, METERED NASAL
Refills: 0 | Status: DISCONTINUED | OUTPATIENT
Start: 2024-10-29 | End: 2024-11-01

## 2024-10-29 RX ORDER — OXYCODONE HYDROCHLORIDE 30 MG/1
5 TABLET ORAL
Refills: 0 | Status: DISCONTINUED | OUTPATIENT
Start: 2024-10-29 | End: 2024-11-01

## 2024-10-29 RX ORDER — VIBEGRON 75 MG/1
1 TABLET, FILM COATED ORAL
Refills: 0 | DISCHARGE

## 2024-10-29 RX ORDER — SERTRALINE HYDROCHLORIDE 100 MG/1
1 TABLET, FILM COATED ORAL
Refills: 0 | DISCHARGE

## 2024-10-29 RX ORDER — LEVOTHYROXINE SODIUM 88 MCG
50 TABLET ORAL DAILY
Refills: 0 | Status: DISCONTINUED | OUTPATIENT
Start: 2024-10-29 | End: 2024-11-01

## 2024-10-29 RX ADMIN — Medication 1000 MILLIGRAM(S): at 21:27

## 2024-10-29 RX ADMIN — Medication 100 MILLILITER(S): at 16:38

## 2024-10-29 RX ADMIN — APREPITANT 40 MILLIGRAM(S): 40 CAPSULE ORAL at 07:50

## 2024-10-29 RX ADMIN — Medication 2 TABLET(S): at 21:26

## 2024-10-29 RX ADMIN — Medication 75 MILLILITER(S): at 14:33

## 2024-10-29 RX ADMIN — Medication 1000 MILLIGRAM(S): at 21:31

## 2024-10-29 RX ADMIN — Medication 100 MILLIGRAM(S): at 16:37

## 2024-10-29 NOTE — PATIENT PROFILE ADULT - INTERNATIONAL TRAVEL
No contraindication to LP from neurosurgical perspective. Agree with keeping platelet count above 100K    Neurosurgery   1480 No absolute contraindication to LP from neurosurgical perspective. Agree with keeping platelet count above 100K    Neurosurgery   1480 No

## 2024-10-29 NOTE — CONSULT NOTE ADULT - SUBJECTIVE AND OBJECTIVE BOX
HPI:  74 yo female, pmh of depression, hypertension, obesity s/p gastric lap, hypothyroidism, presenting for lumbar stenosis s/p lumbar laminectomy with pedicle screw fusion. Seen on surgical floor, denies current pain, no dizziness, nausea, vomiting, fever chills, numbness    PAST MEDICAL & SURGICAL HISTORY:  History of hypothyroidism      Depression      Hypertension      OA (osteoarthritis)      Morbid obesity  s/p gastric lap      HLD (hyperlipidemia)      [place] (hard of hearing)      H/O sleep apnea      H/O osteoporosis      Internal bleeding      Cardiac arrhythmia      H/O urinary incontinence      Gum hypertrophy      H/O bariatric surgery  lap band 2003      History of hysterectomy  1995      H/O bilateral hip replacements  Left 2004 Right 20019      H/O hernia repair  Umbilical 2009      S/P total knee replacement, right  2019      History of abdominoplasty  Panniculectomy      S/P total knee replacement, left      S/P revision of total hip          ANTIMICROBIAL:  ceFAZolin   IVPB 2000 milliGRAM(s) IV Intermittent every 8 hours    CARDIOVASCULAR:  metoprolol succinate ER 50 milliGRAM(s) Oral daily    PULMONARY:    NEUROLOGIC:  acetaminophen     Tablet .. 1000 milliGRAM(s) Oral every 8 hours  buPROPion XL (24-Hour) . 300 milliGRAM(s) Oral daily  cyclobenzaprine 10 milliGRAM(s) Oral every 8 hours PRN  HYDROmorphone  Injectable 0.5 milliGRAM(s) IV Push every 3 hours PRN  ondansetron Injectable 4 milliGRAM(s) IV Push every 6 hours PRN  oxyCODONE    IR 5 milliGRAM(s) Oral every 3 hours PRN  oxyCODONE    IR 10 milliGRAM(s) Oral every 3 hours PRN  sertraline 150 milliGRAM(s) Oral daily    ONCOLOGIC:    HEMATOLOGIC:    GATROINTESTINAL:  aluminum hydroxide/magnesium hydroxide/simethicone Suspension 30 milliLiter(s) Oral every 12 hours PRN  famotidine    Tablet 20 milliGRAM(s) Oral daily  magnesium hydroxide Suspension 30 milliLiter(s) Oral every 12 hours PRN  pancrelipase  (CREON 24,000 Lipase Units) 2 Capsule(s) Oral three times a day with meals  polyethylene glycol 3350 17 Gram(s) Oral daily  polyethylene glycol 3350 17 Gram(s) Oral daily  senna 2 Tablet(s) Oral at bedtime     MEDS:    ENDO/METABOLIC:  atorvastatin 10 milliGRAM(s) Oral at bedtime  levothyroxine 50 MICROGram(s) Oral daily    IV FLUID/NUTRITION:  lactated ringers. 1000 milliLiter(s) IV Continuous <Continuous>    TOPICAL:  fluticasone propionate 50 MICROgram(s)/spray Nasal Spray 1 Spray(s) Both Nostrils two times a day    IMMUNOLOGIC & OTHER        Allergies    penicillins (Rash)  penicillin (Rash)    Intolerances        SOCIAL HISTORY:  denies etoh, smoking   FAMILY HISTORY:  FH: CHF (congestive heart failure) (Father)    FH: chronic kidney disease (Mother)    FH: chronic kidney disease (Sibling)        Vital Signs Last 24 Hrs  T(C): 36.7 (29 Oct 2024 15:15), Max: 36.7 (29 Oct 2024 15:15)  T(F): 98 (29 Oct 2024 15:15), Max: 98 (29 Oct 2024 15:15)  HR: 57 (29 Oct 2024 15:15) (55 - 80)  BP: 100/55 (29 Oct 2024 15:15) (94/54 - 136/71)  BP(mean): --  RR: 20 (29 Oct 2024 15:15) (11 - 23)  SpO2: 96% (29 Oct 2024 15:15) (94% - 100%)    Parameters below as of 29 Oct 2024 15:15  Patient On (Oxygen Delivery Method): room air          I&O's Detail    29 Oct 2024 07:01  -  29 Oct 2024 16:10  --------------------------------------------------------  IN:    Lactated Ringers: 1625 mL    Oral Fluid: 500 mL  Total IN: 2125 mL    OUT:    Accordian (mL): 40 mL    Blood Loss (mL): 600 mL  Total OUT: 640 mL    Total NET: 1485 mL        Daily Height in cm: 154.94 (29 Oct 2024 07:42)    Daily     REVIEW OF SYSTEMS:  as per hpi, other systems reviewed and are negative    PHYSICAL EXAM:    GENERAL: NAD, well-groomed, older female  HEAD:  Atraumatic, Normocephalic  EYES: EOMI, PERRLA, conjunctiva and sclera clear  ENMT: No tonsillar erythema, exudates, or enlargement; Moist mucous membranes, No lesions  NECK: Supple, No JVD   NERVOUS SYSTEM:  Alert & Oriented X3, Good concentration; moving all extremities, sensation grossly intact  CHEST/LUNG: Clear to auscultation bilaterally; No rales, rhonchi, wheezing, or rubs  HEART: Regular rate and rhythm; No murmurs, rubs, or gallops  ABDOMEN: Soft, Nontender, Nondistended; Bowel sounds present  EXTREMITIES:  2+ Peripheral Pulses, No clubbing,   LYMPH: No lymphadenopathy          LABS:    Reviewed presurgical H+P, presurgical labs                   RADIOLOGY & ADDITIONAL STUDIES:

## 2024-10-29 NOTE — PHYSICAL THERAPY INITIAL EVALUATION ADULT - NAME OF CLINICIAN
"ER Provider Note    Primary Care Provider: Pcp Pt States None    CHIEF COMPLAINT  Chief Complaint   Patient presents with    Cough    Generalized Body Aches    Abdominal Pain     HPI/ROS  OUTSIDE HISTORIAN(S):  Parent at bedside who provided history as seen below.     Brooklyn Wilde is a 10 y.o. male who presents to the ED for abdominal pain onset this morning. Father adds that the patient also had a tactile fever, congestion, cough and chills. He denies any vomiting, diarrhea or sore throat. His last bowel movement was yesterday and he denies any straining or hard stools. Father notes that the patient has had decreased appetite with generalized weakness. The patient finished amoxicillin for an ear infection two days ago as he stopped taking it because he felt better. At the time of the ear infection diagnosis, he just had pain to bilateral ears. The patient has no major past medical history, takes no daily medications, and has no allergies to medication. Vaccinations are up to date.     PAST MEDICAL HISTORY  History reviewed. No pertinent past medical history.  Vaccinations are UTD.     SURGICAL HISTORY  History reviewed. No pertinent surgical history.    FAMILY HISTORY  No family history noted.    SOCIAL HISTORY     Patient is accompanied by his father, whom he lives with.     CURRENT MEDICATIONS  Current Outpatient Medications   Medication Instructions    amoxicillin (AMOXIL) 872 mg, Oral, 2 TIMES DAILY       ALLERGIES  Patient has no known allergies.    PHYSICAL EXAM  BP (!) 137/77 Comment: RN aware  Pulse 130   Temp (!) 38.3 °C (100.9 °F) (Temporal)   Resp 28   Ht 1.33 m (4' 4.36\")   Wt 29 kg (63 lb 14.9 oz)   SpO2 100%   BMI 16.39 kg/m²   Constitutional: Well developed, Well nourished, No acute distress, Non-toxic appearance.   HENT: Normocephalic, Atraumatic, Bilateral external ears normal, Left TM is opaque and bulging, Right TM is normal, Oropharynx moist, No oral exudates, Clear nasal " discharge.   Eyes: PERRL, EOMI, Conjunctiva normal, No discharge.  Neck: Neck has normal range of motion, no tenderness, and is supple.   Lymphatic: No cervical lymphadenopathy noted.   Cardiovascular: Normal heart rate, Normal rhythm, No murmurs, No rubs, No gallops.   Thorax & Lungs: Normal breath sounds, No respiratory distress, No wheezing, No chest tenderness, No accessory muscle use, No stridor.  Skin: Warm, Dry, No erythema, No rash.   Abdomen: Mildly full but Soft, No tenderness, No masses.  Neurologic: Alert & oriented, Moves all extremities equally.    DIAGNOSTIC STUDIES & PROCEDURES    Labs:   Results for orders placed or performed during the hospital encounter of 03/03/24   POC CoV-2, FLU A/B, RSV by PCR   Result Value Ref Range    POC Influenza A RNA, PCR POSITIVE (A) Negative    POC Influenza B RNA, PCR Negative Negative    POC RSV, by PCR Negative Negative    POC SARS-CoV-2, PCR NotDetected       All labs reviewed by me.    Radiology:   The attending Emergency Physician has independently interpreted the diagnostic imaging associated with this visit and is awaiting the final reading from the radiologist, which will be displayed below.      Preliminary interpretation is a follows: Mild increased stool burden  Radiologist interpretation:  [id number] VIEW   Final Result      1.  Nonobstructive bowel gas pattern with a large amount of colonic stool.         COURSE & MEDICAL DECISION MAKING    ED Observation Status? No; Patient does not meet criteria for ED Observation.     INITIAL ASSESSMENT AND PLAN  Care Narrative:     3:17 PM - Patient was evaluated; Patient presents for evaluation of abdominal pain onset this morning. Father adds that the patient also had a tactile fever, congestion, cough and chills. He denies any vomiting, diarrhea or sore throat. His last bowel movement was yesterday and he denies any straining or hard stools. Father notes that the patient has had decreased appetite with  generalized weakness. The patient finished amoxicillin for an ear infection two days ago as he stopped taking it because he felt better. At the time of the ear infection diagnosis, he just had pain to bilateral ears. The patient is well appearing here with reassuring vitals and exam. Exam reveals clear nasal discharge, left TM is opaque and bulging, right TM is normal, and abdomen is mildly full but soft and non tender. Exam is not consistent with pneumonia, or bronchiolitis. He most likely has a viral URI which is likely COVID or flu.  His abdominal pain may be related to the viral illness however can screen for constipation.  Discussed plan of care, including a diagnostic work up with a viral panel as well as imaging. Dad agrees to plan of care. POCT CoV-2, Flu A/B, RSV by PCR and DX-Abdomen ordered. The patient was medicated with Motrin 300 mg oral suspension and Zofran ODT 4 mg dispertab for his symptoms.     4:48 PM - Patient was reevaluated at bedside. Discussed lab and radiology results with the patient's father and informed them that they were positive for influenza A. The X-ray showed increased stool which was consistent with constipation.  Unsure if this is the etiology of his abdominal pain.  Discussed the use of stool softeners with dad.  I informed father of the plan for discharge. He was allowed to ask questions and agrees to the plan of care.     DISPOSITION:  Patient will be discharged home with parent in stable condition.    FOLLOW UP:  Primary provider      As needed, If symptoms worsen    Guardian was given return precautions and verbalizes understanding. They will return for new or worsening symptoms.      FINAL IMPRESSION  1. Influenza A    2. Generalized abdominal pain    3. Acute suppurative otitis media of left ear without spontaneous rupture of tympanic membrane, recurrence not specified       Alina PASCUAL (Scribe), am scribing for, and in the presence of, Mumtaz Martin,  M.D..    Electronically signed by: Alina Moore (Scribe), 3/3/2024    IMumtaz M.D. personally performed the services described in this documentation, as scribed by Alina Moore in my presence, and it is both accurate and complete.     The note accurately reflects work and decisions made by me.  Mumtaz Martin M.D.  3/3/2024  6:21 PM   MORAIMA Campos

## 2024-10-29 NOTE — PROGRESS NOTE ADULT - SUBJECTIVE AND OBJECTIVE BOX
Post Op Note    SUBJECTIVE    73y Female  s/p  Posterior Lumbar Fusion     Patient seen and examined at bedside.   Pain  well controlled on current pain regimen  Denies CP, SOB, N/V/D, weakness, numbness   No new complaints     OBJECTIVE     Vital Signs Last 24 Hrs  T(C): 36.7 (29 Oct 2024 15:15), Max: 36.7 (29 Oct 2024 15:15)  T(F): 98 (29 Oct 2024 15:15), Max: 98 (29 Oct 2024 15:15)  HR: 57 (29 Oct 2024 15:15) (55 - 80)  BP: 100/55 (29 Oct 2024 15:15) (94/54 - 136/71)  BP(mean): --  RR: 20 (29 Oct 2024 15:15) (11 - 23)  SpO2: 96% (29 Oct 2024 15:15) (94% - 100%)    Parameters below as of 29 Oct 2024 15:15  Patient On (Oxygen Delivery Method): room air      I&O's Summary    29 Oct 2024 07:01  -  29 Oct 2024 15:57  --------------------------------------------------------  IN: 2125 mL / OUT: 640 mL / NET: 1485 mL        PHYSICAL EXAM    Primary surgical dressing  C/D/I w drain in place  Calves supple/nontender bilaterally  Sensation grossly intact to light touch bilaterally  EHL/FHL intact bilaterally    LABS                     ASSESSMENT AND PLAN:     -  Continue current pain management   -  DVT prophylaxis: SCDs       -  Continue current antibiotics as per SCIP protocol  -  PT/OT: Weight bearing as tolerated, spine precautions   -  Incentive spirometry  - IVF  - Advance diet as tolerated  - Follow up labs  - Disposition:     pending PT evaluation

## 2024-10-29 NOTE — DISCHARGE NOTE PROVIDER - CARE PROVIDER_API CALL
Fortino Villatoro [facility]  Spine Surgery  [address], [address]  [place] [address]  Phone: [phone]  Fax: [phone]  Scheduled Appointment: 11/08/2024 10:00 AM

## 2024-10-29 NOTE — PHYSICAL THERAPY INITIAL EVALUATION ADULT - PERTINENT HX OF CURRENT PROBLEM, REHAB EVAL
Lumbar laminectomy L3-5, posterior spinal fusion l3-5, posterior spinal instrumentation L3-5, autograft, allograft.    This is a 72 y/o female who has had shooting pain of legs and low back pain for about 2 months, tests show pinched nerves; to have lumbar laminectomy, fusion and other procedures

## 2024-10-29 NOTE — PHYSICAL THERAPY INITIAL EVALUATION ADULT - ADDITIONAL COMMENTS
Pt lives with  in house. Home has 1 AYAKA and no steps inside. Pt states she uses a RW only for long walks. Owns RW. Reports  is available to assist as needed.

## 2024-10-29 NOTE — PHYSICAL THERAPY INITIAL EVALUATION ADULT - DID THE PATIENT HAVE SURGERY?
lumbar laminectomy L3-5, posterior spinal fusion l3-5, posterior spinal instrumentation L3-5, autograft, allograft/yes

## 2024-10-29 NOTE — DISCHARGE NOTE PROVIDER - NSDCFUSCHEDAPPT_GEN_ALL_CORE_FT
Fortino Villatoro  [facility]  ORTHOSURG [address]  Scheduled Appointment: 11/08/2024

## 2024-10-29 NOTE — CONSULT NOTE ADULT - ASSESSMENT
72 yo female, pmh of depression, hypertension, hld, obesity s/p gastric lap on pancreatic enzymes, hypothyroidism, presenting for lumbar stenosis s/p lumbar laminectomy with pedicle screw fusion.      lumbar stenosis s/p laminectomy, pedicle screw fusion  - pain control - standing tylenol 1000 q8h,  - cyclobenzaprine 10 tid prn for muscle spasm  oxycodone 5/10 prn for mod/severe pain, dilaudid iv for breakthrough pain  - bowel regimen   - pt/ot    HTN - hold home hydrochlorothiazide, continue toprol with holding parameters    hypothyroidism - continue synthroid 50 mcg daily    depression - continue buproprion, sertraline    s/p gastric lap - continue creon    hld - continue lipitor    gi ppx - pepcid

## 2024-10-29 NOTE — DISCHARGE NOTE PROVIDER - NSDCCPCAREPLAN_GEN_ALL_CORE_FT
PRINCIPAL DISCHARGE DIAGNOSIS  Diagnosis: Lumbar stenosis  Assessment and Plan of Treatment: L3-5 with spondylolisthesis, raciculopathy

## 2024-10-29 NOTE — DISCHARGE NOTE PROVIDER - NSDCMRMEDTOKEN_GEN_ALL_CORE_FT
atorvastatin 10 mg oral tablet: 1 tab(s) orally once a day  buPROPion 150 mg/12 hours (SR) oral tablet, extended release: 2 tab(s) orally once a day  Centrum multivitamin: 1 tab(s) orally once a day  CoQ10: 200 milligram(s) orally once a day  Creon 24,000 units oral delayed release capsule: 2 cap(s) orally 3 times a day  fluticasone propionate 55 mcg/inh inhalation powder: 2 spray(s) inhaled once a day  Gemtesa 75 mg oral tablet: 1 tab(s) orally once a day  Glucosamine and Chondroitin with MSM oral tablet: 2 tab(s) orally once a day  hydroCHLOROthiazide 12.5 mg oral capsule: 1 cap(s) orally once a day  levothyroxine 50 mcg (0.05 mg)/mL oral solution: 1 tab(s) orally once a day  Lumbar Brace: Wear when out of bed for comfort and support. S/p L3-5 Laminectomy and Fusion  metoprolol succinate 50 mg oral capsule, extended release: 1 cap(s) orally once a day  MiraLax oral powder for reconstitution: orally once a day  Pepcid: 1 tab(s) orally once a day  potassium citrate ER 10 meq-2 tabs tid:   Prolia 60 mg/mL subcutaneous solution: 60 milligram(s) subcutaneously every 6 months  sertraline 150 mg oral capsule: 1 cap(s) orally once a day   acetaminophen 500 mg oral tablet: 2 tab(s) orally every 8 hours  atorvastatin 10 mg oral tablet: 1 tab(s) orally once a day  buPROPion 150 mg/12 hours (SR) oral tablet, extended release: 2 tab(s) orally once a day  Centrum multivitamin: 1 tab(s) orally once a day  CoQ10: 200 milligram(s) orally once a day  Creon 24,000 units oral delayed release capsule: 2 cap(s) orally 3 times a day  cyclobenzaprine 10 mg oral tablet: 1 tab(s) orally every 8 hours as needed for  muscle spasm  fluticasone propionate 55 mcg/inh inhalation powder: 2 spray(s) inhaled once a day  Gemtesa 75 mg oral tablet: 1 tab(s) orally once a day  Glucosamine and Chondroitin with MSM oral tablet: 2 tab(s) orally once a day  hydroCHLOROthiazide 12.5 mg oral capsule: 1 cap(s) orally once a day  levothyroxine 50 mcg (0.05 mg)/mL oral solution: 1 tab(s) orally once a day  metoprolol succinate 50 mg oral capsule, extended release: 1 cap(s) orally once a day  MiraLax oral powder for reconstitution: orally once a day  oxyCODONE 5 mg oral tablet: 1 tab(s) orally every 4 to 6 hours as needed for  severe pain [place]  [id number] MDD: 6 tablets  Pepcid: 1 tab(s) orally once a day  potassium citrate ER 10 meq-2 tabs tid:   Prolia 60 mg/mL subcutaneous solution: 60 milligram(s) subcutaneously every 6 months  senna leaf extract oral tablet: 2 tab(s) orally once a day (at bedtime)  sertraline 150 mg oral capsule: 1 cap(s) orally once a day

## 2024-10-29 NOTE — BRIEF OPERATIVE NOTE - NSICDXBRIEFPREOP_GEN_ALL_CORE_FT
PRE-OP DIAGNOSIS:  Lumbar stenosis 29-Oct-2024 12:52:11 L3-5 Joseph Steen  Spondylolisthesis, lumbar region 29-Oct-2024 12:53:01  Joseph Steen

## 2024-10-29 NOTE — DISCHARGE NOTE PROVIDER - NSDCFUADDINST_GEN_ALL_CORE_FT
For Constipation :   • Increase your water intake. Drink at least 8 glasses of water daily.  • Try adding fiber to your diet by eating fruits, vegetables and foods that are rich in grains.  • If you do experience constipation, you may take an over-the-counter stool softener/laxative such as Rina Colace, Senekot or  Milk of Magnesia.   - Call your doctor if you experience:  • An increase in pain not controlled by pain medication or change in activity or  position.  • Temperature greater than 101° F.  • Redness, increased swelling or foul smelling drainage from or around the  incision.  • Numbness, tingling or a change in color or temperature of the operative leg.  • Call your doctor immediately if you experience chest pain, shortness of breath or calf pain.

## 2024-10-29 NOTE — DISCHARGE NOTE PROVIDER - NSDCCPTREATMENT_GEN_ALL_CORE_FT
PRINCIPAL PROCEDURE  Procedure: Laminectomy with fusion of lumbar spine by posterior or posterolateral approach  Findings and Treatment: L3-5  You have just had spine surgery.  Please take care of your back by adhering to some basic recommendations.  Your surgeon recommends taking acetaminophen (tylenol) 1000mg 3 times per day for the next 14 to 21 days.  Apply icepacks to the surgical area to reduce swelling and discomfort.  This can help to minimize the need for stronger medications.  No heavy lifting. Do not lift more than 10 pounds.  Avoid twisting and bending over.  Do NOT drive a car.  You may be driven in a car.  You may shower if the dressing is the clear plastic type or if you cover the existing dressing with plastic and tape to prevent it from getting wet.  Change dressing with dry, sterile gauze and tape if it becomes loose, wet or soiled.  Wear your back brace when out of bed for support and comfort.   Observe low back precautions as described by the Physical Therapist.  Walking is your best exercise.  It is best not to remain in one position for long periods such as long car rides.  Constipation is a common problem associated with surgery and pain medications.  You should ensure that you are drinking plenty of fluids and increasing your fruit and vegetable intake.  You are advised to take Docusate 100mg three times per day to prevent opioid induced constipation.  To treat opioid induced constipation use Senna (Senokot) or Bisacodyl (Dulcolax) or PEG 3350 (Miralax) every evening until your first bowel movement and then every evening as needed.  To treat opioid induced bloating and gas pain use Simethicone (Gas-X) 80 mg per label directions.   Smoking should be avoided.  Tobacco products are associated with back problems.       Call the doctor with questions, fevers, severe headache, bowel or bladder dysfunction, new numbness/weakness or new pain not relieved by medication, ice pack and change of position.  You should see your surgeon for follow up within 14 days of surgery.

## 2024-10-29 NOTE — BRIEF OPERATIVE NOTE - NSICDXBRIEFPOSTOP_GEN_ALL_CORE_FT
POST-OP DIAGNOSIS:  Spondylolisthesis, lumbar region 29-Oct-2024 12:53:19  Joseph Steen  Lumbar stenosis 29-Oct-2024 12:53:09 L3-5 Joseph Steen

## 2024-10-29 NOTE — PATIENT PROFILE ADULT - FALL HARM RISK - RISK INTERVENTIONS

## 2024-10-29 NOTE — DISCHARGE NOTE PROVIDER - CARE PROVIDERS DIRECT ADDRESSES
[email or web address]

## 2024-10-29 NOTE — CHART NOTE - NSCHARTNOTEFT_GEN_A_CORE
Pt seen at bedside to measure and fit with LSO to be used OOB only after surgery.  Pt measured and brace adjusted to her dimensions.  Tension straps adjusted to increase unloading of spine.  Written instructions and office contact info provided for reference  Follow up if needed      Dennis DIAMOND  [phone]

## 2024-10-29 NOTE — DISCHARGE NOTE PROVIDER - HOSPITAL COURSE
This is  a 73 y.o. female  admitted to [facility] on October 29, 2024 for elective L3-5 laminectomy/Lumbar Fusion by Dr DENILSON Villatoro.  PST performed at [facility]  including H & P, pre anesthesia screening, Pre-Op testing. Preoperative medical clearances were obtained.    Perioperative antibiotics given for infection prevention in accordance with SCIP criteria.  Neuromonitoring was done throughout the case. No complications occurred. Recovery in PACU was uneventful, the patient was then transferred to floor for acute post -operative surgical care.  Patient has a stable Post-Op course with no major medical complications. Post-op medical consultation was obtained with hospitalist service for medical comanagement. Post-Op labs followed by Ortho & Medical team.   Routine  PT and OT consult for Post Spine surgery modalities with the goal of increased mobility and independence.   Petrona De Luna for post-op for VTEP,.   Diet was well tolerated post-op.  Lumbar incision site appeared clean with no signs of surgical site infection. Stable neuro exam of BLEs remained stable postoperatively.   No medical / hospital complications.  A follow up post op x-ray revealed no change to hardware position.  The patient was felt to benefit from further rehabilitative care for restoration to level of function/mobility. This was felt to best be accomplished in a home setting, which was also pts choice.   All discharge instructions reviewed. To follow up with surgeon & PMD  after discharge.   This is  a 73 y.o. female  admitted to [facility] on October 29, 2024 for elective L3-5 laminectomy/Lumbar Fusion by Dr DENILSON Villatoro.  PST performed at [facility]  including H & P, pre anesthesia screening, Pre-Op testing. Preoperative medical clearances were obtained.    Perioperative antibiotics given for infection prevention in accordance with SCIP criteria.  Neuromonitoring was done throughout the case. No complications occurred. Recovery in PACU was uneventful, the patient was then transferred to floor for acute post -operative surgical care.  Patient has a stable Post-Op course with no major medical complications. Post-op medical consultation was obtained with hospitalist service for medical comanagement. Post-Op labs followed by Ortho & Medical team.   Routine  PT and OT consult for Post Spine surgery modalities with the goal of increased mobility and independence.   Petrona De Luna for post-op for VTEP,.   Diet was well tolerated post-op.  Lumbar incision site appeared clean with no signs of surgical site infection. Stable neuro exam of BLEs remained stable postoperatively.   No medical / hospital complications.  A follow up post op x-ray revealed no change to hardware position.  The patient was felt to benefit from further rehabilitative care for restoration to level of function/mobility. This was felt to best be accomplished in a rehab setting, which was also pts choice.   All discharge instructions reviewed. To follow up with surgeon & PMD  after discharge.

## 2024-10-30 ENCOUNTER — TRANSCRIPTION ENCOUNTER (OUTPATIENT)
Age: 74
End: 2024-10-30

## 2024-10-30 LAB
ANION GAP SERPL CALC-SCNC: 9 MMOL/L — SIGNIFICANT CHANGE UP (ref 5–17)
BASOPHILS # BLD AUTO: 0.01 K/UL — SIGNIFICANT CHANGE UP (ref 0–0.2)
BASOPHILS NFR BLD AUTO: 0.1 % — SIGNIFICANT CHANGE UP (ref 0–2)
BUN SERPL-MCNC: 19 MG/DL — SIGNIFICANT CHANGE UP (ref 7–23)
CALCIUM SERPL-MCNC: 8.7 MG/DL — SIGNIFICANT CHANGE UP (ref 8.4–10.5)
CHLORIDE SERPL-SCNC: 106 MMOL/L — SIGNIFICANT CHANGE UP (ref 96–108)
CO2 SERPL-SCNC: 28 MMOL/L — SIGNIFICANT CHANGE UP (ref 22–31)
CREAT SERPL-MCNC: 1.14 MG/DL — SIGNIFICANT CHANGE UP (ref 0.5–1.3)
EGFR: 51 ML/MIN/1.73M2 — LOW
EOSINOPHIL # BLD AUTO: 0.03 K/UL — SIGNIFICANT CHANGE UP (ref 0–0.5)
EOSINOPHIL NFR BLD AUTO: 0.4 % — SIGNIFICANT CHANGE UP (ref 0–6)
GLUCOSE SERPL-MCNC: 132 MG/DL — HIGH (ref 70–99)
HCT VFR BLD CALC: 31.4 % — LOW (ref 34.5–45)
HGB BLD-MCNC: 10.1 G/DL — LOW (ref 11.5–15.5)
IMM GRANULOCYTES NFR BLD AUTO: 0.4 % — SIGNIFICANT CHANGE UP (ref 0–0.9)
LYMPHOCYTES # BLD AUTO: 0.99 K/UL — LOW (ref 1–3.3)
LYMPHOCYTES # BLD AUTO: 14 % — SIGNIFICANT CHANGE UP (ref 13–44)
MCHC RBC-ENTMCNC: 29.4 PG — SIGNIFICANT CHANGE UP (ref 27–34)
MCHC RBC-ENTMCNC: 32.2 G/DL — SIGNIFICANT CHANGE UP (ref 32–36)
MCV RBC AUTO: 91.3 FL — SIGNIFICANT CHANGE UP (ref 80–100)
MONOCYTES # BLD AUTO: 0.6 K/UL — SIGNIFICANT CHANGE UP (ref 0–0.9)
MONOCYTES NFR BLD AUTO: 8.5 % — SIGNIFICANT CHANGE UP (ref 2–14)
NEUTROPHILS # BLD AUTO: 5.41 K/UL — SIGNIFICANT CHANGE UP (ref 1.8–7.4)
NEUTROPHILS NFR BLD AUTO: 76.6 % — SIGNIFICANT CHANGE UP (ref 43–77)
NRBC # BLD: 0 /100 WBCS — SIGNIFICANT CHANGE UP (ref 0–0)
PLATELET # BLD AUTO: 165 K/UL — SIGNIFICANT CHANGE UP (ref 150–400)
POTASSIUM SERPL-MCNC: 3.4 MMOL/L — LOW (ref 3.5–5.3)
POTASSIUM SERPL-SCNC: 3.4 MMOL/L — LOW (ref 3.5–5.3)
RBC # BLD: 3.44 M/UL — LOW (ref 3.8–5.2)
RBC # FLD: 14.4 % — SIGNIFICANT CHANGE UP (ref 10.3–14.5)
SODIUM SERPL-SCNC: 143 MMOL/L — SIGNIFICANT CHANGE UP (ref 135–145)
WBC # BLD: 7.07 K/UL — SIGNIFICANT CHANGE UP (ref 3.8–10.5)
WBC # FLD AUTO: 7.07 K/UL — SIGNIFICANT CHANGE UP (ref 3.8–10.5)

## 2024-10-30 PROCEDURE — 72100 X-RAY EXAM L-S SPINE 2/3 VWS: CPT | Mod: 26

## 2024-10-30 PROCEDURE — 99232 SBSQ HOSP IP/OBS MODERATE 35: CPT

## 2024-10-30 RX ORDER — SENNA 187 MG
2 TABLET ORAL
Qty: 0 | Refills: 0 | DISCHARGE
Start: 2024-10-30

## 2024-10-30 RX ORDER — ACETAMINOPHEN 500 MG
2 TABLET ORAL
Qty: 0 | Refills: 0 | DISCHARGE
Start: 2024-10-30

## 2024-10-30 RX ORDER — CYCLOBENZAPRINE HYDROCHLORIDE 30 MG/1
1 CAPSULE, EXTENDED RELEASE ORAL
Qty: 21 | Refills: 0
Start: 2024-10-30 | End: 2024-11-05

## 2024-10-30 RX ORDER — OXYCODONE HYDROCHLORIDE 30 MG/1
1 TABLET ORAL
Qty: 42 | Refills: 0
Start: 2024-10-30 | End: 2024-11-05

## 2024-10-30 RX ADMIN — Medication 10 MILLIGRAM(S): at 06:08

## 2024-10-30 RX ADMIN — Medication 1000 MILLIGRAM(S): at 06:17

## 2024-10-30 RX ADMIN — Medication 50 MICROGRAM(S): at 06:08

## 2024-10-30 RX ADMIN — Medication 1000 MILLIGRAM(S): at 15:50

## 2024-10-30 RX ADMIN — FLUTICASONE PROPIONATE 1 SPRAY(S): 50 SPRAY, METERED NASAL at 17:53

## 2024-10-30 RX ADMIN — ONDANSETRON HYDROCHLORIDE 4 MILLIGRAM(S): 2 INJECTION, SOLUTION INTRAMUSCULAR; INTRAVENOUS at 10:49

## 2024-10-30 RX ADMIN — Medication 10 MILLIGRAM(S): at 21:50

## 2024-10-30 RX ADMIN — FLUTICASONE PROPIONATE 1 SPRAY(S): 50 SPRAY, METERED NASAL at 06:08

## 2024-10-30 RX ADMIN — Medication 2 TABLET(S): at 21:50

## 2024-10-30 RX ADMIN — Medication 1000 MILLIGRAM(S): at 14:09

## 2024-10-30 RX ADMIN — PANCRELIPASE 2 CAPSULE(S): 16800; 98400; 56800 CAPSULE, DELAYED RELEASE ORAL at 12:16

## 2024-10-30 RX ADMIN — Medication 1000 MILLIGRAM(S): at 06:08

## 2024-10-30 RX ADMIN — POLYETHYLENE GLYCOL 3350 17 GRAM(S): 17 POWDER, FOR SOLUTION ORAL at 12:16

## 2024-10-30 RX ADMIN — Medication 1000 MILLIGRAM(S): at 22:07

## 2024-10-30 RX ADMIN — Medication 1000 MILLIGRAM(S): at 21:50

## 2024-10-30 RX ADMIN — Medication 300 MILLIGRAM(S): at 12:16

## 2024-10-30 RX ADMIN — SERTRALINE HYDROCHLORIDE 150 MILLIGRAM(S): 50 TABLET, FILM COATED ORAL at 12:16

## 2024-10-30 RX ADMIN — FAMOTIDINE 20 MILLIGRAM(S): 10 INJECTION INTRAVENOUS at 12:16

## 2024-10-30 RX ADMIN — PANCRELIPASE 2 CAPSULE(S): 16800; 98400; 56800 CAPSULE, DELAYED RELEASE ORAL at 17:54

## 2024-10-30 RX ADMIN — Medication 100 MILLIGRAM(S): at 01:17

## 2024-10-30 NOTE — CARE COORDINATION ASSESSMENT. - NSADDITIONAL INFORMATION_FT
TRANSITION OF CARE PLAN  Patient is self-directing own DC planning; DC to home; spouse YOANNA [phone] will assume care; home care with [facility] per pt. choice; to  f/u with LESLIE GAMBINO MD post DC;  will arrange own transport at DC.  Patient was instructed to obtain medical clearance prior to driving, verbalized understanding; Discussed/ explained [facility] Transitional Care Management Post DC Engagement assistance; verbalized understanding. Accepted by [facility]; patient is requesting previous PT;

## 2024-10-30 NOTE — CAREGIVER ENGAGEMENT NOTE - CAREGIVER OUTREACH NOTES - FREE TEXT
TRANSITION OF CARE PLAN  Patient is self-directing own DC planning; DC to home; spouse YOANNA [phone] will assume care; home care with [facility] per pt. choice; to  f/u with LESLIE GAMBINO MD post DC;  will arrange own transport at DC.  Patient was instructed to obtain medical clearance prior to driving, verbalized understanding; Discussed/ explained [facility] Transitional Care Management Post DC Engagement assistance; verbalized understanding. Accepted by [facility]; patient is requesting previous PT;

## 2024-10-30 NOTE — PROGRESS NOTE ADULT - SUBJECTIVE AND OBJECTIVE BOX
POST OPERATIVE DAY #:  1  STATUS POST: L3-L5 posterior spinal fusion    SUBJECTIVE: Patient seen and examined at bedside. Denies nausea, vomiting, diarrhea, chest pain, shortness of breath, headache, dizziness. Hemovac x 1 was removed. Drain site is clean. Dressing was changed and patient tolerated procedure well.    Pain (0-10): 5/10      OBJECTIVE:     Vital Signs Last 24 Hrs  T(C): 36.8 (30 Oct 2024 08:05), Max: 37.1 (30 Oct 2024 03:30)  T(F): 98.2 (30 Oct 2024 08:05), Max: 98.7 (30 Oct 2024 03:30)  HR: 59 (30 Oct 2024 08:05) (55 - 80)  BP: 97/61 (30 Oct 2024 08:05) (94/54 - 121/58)  BP(mean): --  RR: 15 (30 Oct 2024 08:05) (11 - 20)  SpO2: 95% (30 Oct 2024 08:05) (94% - 97%)    Parameters below as of 30 Oct 2024 03:30  Patient On (Oxygen Delivery Method): nasal cannula                   Spine          Dressing:  clean/dry/intact; LSO brace intact         Sensation:  intact to light touch           Motor exam:           Lower extremity                   HF         HAb       HAd       KF          KE         DF     PF     Ev       Inv     EHL                                               R        5/5        5/5          5/5       5/5         5/5       5/5   5/5    5/5     5/5     5/5                                               L         5/5        5/5          5/5       5/5         5/5       5/5   5/5    5/5     5/5     5/5           Calves supple and NT                                              2+ DP and PT pulses bilaterally          SCDs in place             LABS:                        10.1   7.07  )-----------( 165      ( 30 Oct 2024 08:17 )             31.4     10-30    143  |  106  |  19  ----------------------------<  132[H]  3.4[L]   |  28  |  1.14    Ca    8.7      30 Oct 2024 08:17        Urinalysis Basic - ( 30 Oct 2024 08:17 )    Color: x / Appearance: x / SG: x / pH: x  Gluc: 132 mg/dL / Ketone: x  / Bili: x / Urobili: x   Blood: x / Protein: x / Nitrite: x   Leuk Esterase: x / RBC: x / WBC x   Sq Epi: x / Non Sq Epi: x / Bacteria: x          A/P :  73y Female, stable,  s/p L3-L5 posterior spinal fusion POD # 1  -    Pain control  -    DVT ppx: SCDs    -    LSO brace  -    Encourage Incentive Spirometry  -    Physical Therapy  -    Occupational Therapy  -    Weight bearing status: WBAT  -    Discharge Plan: home today pending PT/OT/medical clearance

## 2024-10-30 NOTE — OCCUPATIONAL THERAPY INITIAL EVALUATION ADULT - PERTINENT HX OF CURRENT PROBLEM, REHAB EVAL
this is a 74 y/o female who has had shooting pain of legs and low back pain for about 2 months, tests show pinched nerves; to have lumbar laminectomy, fusion and other procedures    PROCEDURES: 10/29/24  Lumbar laminectomy with pedicle screw fusion L3-5   L3-4 and L4-5 stenosis, spondylolisthesis

## 2024-10-30 NOTE — DISCHARGE NOTE NURSING/CASE MANAGEMENT/SOCIAL WORK - PATIENT PORTAL LINK FT
You can access the FollowMyHealth Patient Portal offered by [facility] by registering at the following website: [email or web address]. By joining [employer]’s FollowMyHealth portal, you will also be able to view your health information using other applications (apps) compatible with our system.

## 2024-10-30 NOTE — DISCHARGE NOTE NURSING/CASE MANAGEMENT/SOCIAL WORK - NSDCPEFALRISK_GEN_ALL_CORE
For information on Fall & Injury Prevention, visit: [email or web address] OR  [email or web address] OR  https://www.cdc.gov/steadi/patient.html

## 2024-10-30 NOTE — CARE COORDINATION ASSESSMENT. - OTHER PERTINENT REFERRAL INFORMATION
RN ARACELI met with pt. and spouse for assessment; transition of care planning at bedside, lives with family in private home; 2STE to enter 0STE to bedroom; A&O x4;  CM role and Dc planning process explained; verbalized understanding.  Pt. reports; ambulates on own power; independent in ADLs/ iADLs; Patient DOES NOT have RW at home; Estimated DC 10/31/2024;

## 2024-10-30 NOTE — DISCHARGE NOTE NURSING/CASE MANAGEMENT/SOCIAL WORK - NSSCTYPOFSERV_GEN_ALL_CORE
[facility]/ Home Care Services with [facility] [phone]  Home Care RN will call within 24/48 hrs of DC from the hospital to set up Initial Assessment.

## 2024-10-30 NOTE — PROGRESS NOTE ADULT - SUBJECTIVE AND OBJECTIVE BOX
Discharge medication calendar:  APAP 1000mg q8h x 2-3 weeks  cyclobenzaprine 10 mg TID PRN muscle spasm  Narcotic PRN  Docusate 100mg TID while taking narcotic  Miralax, Senna, or Bisacodyl PRN for treatment of constipation

## 2024-10-30 NOTE — PROGRESS NOTE ADULT - ASSESSMENT
74 yo female, pmh of depression, hypertension, hld, obesity s/p gastric lap on pancreatic enzymes, hypothyroidism, presenting for lumbar stenosis s/p lumbar laminectomy with pedicle screw fusion.      lumbar stenosis s/p laminectomy, pedicle screw fusion  - pain control - standing tylenol 1000 q8h,  - cyclobenzaprine 10 tid prn for muscle spasm  oxycodone 5/10 prn for mod/severe pain, dilaudid iv for breakthrough pain  - bowel regimen   - pt/ot  - no medical contraindication to DC pending PT clearance    HTN - continue to hold home hydrochlorothiazide, continue toprol with holding parameters    hypothyroidism - continue synthroid 50 mcg daily    depression - continue buproprion, sertraline    s/p gastric lap - continue creon    hld - continue lipitor    gi ppx - pepcid

## 2024-10-30 NOTE — CARE COORDINATION ASSESSMENT. - NSCAREPROVIDERS_GEN_ALL_CORE_FT
CARE PROVIDERS:  Administration: Kimberly Robles  Admitting: Fortino Villatoro  Attending: Fortino Villatoro  Case Management: Santaromana, Anna  Consultant: Kan Ontiveros  Consultant: Dennis Johnston  Nurse: Maty Rahman  Nurse: Adam, Merline  Nurse: Minnie Goyal  Occupational Therapy: Dejah Zambrano  Ordered: Physician, Ordering  Override: Cristela Kenny  Physical Therapy: Rocky Dooley  Physical Therapy: Tra Omer  Primary Team: Glenis Peres  Primary Team: Joseph Steen  Primary Team: Chari Castro  Primary Team: Tony Baliey  Primary Team: Kareem Bourne  Registered Dietitian: Radha Sue  Registered Dietitian: Luzma Blank  : Miriam Yoder  Team: LINDA  Hospitalists, Team  UR// Supp. Assoc.: Bella Carnes

## 2024-10-30 NOTE — PATIENT CHOICE NOTE. - NSPTCHOICENOTES_GEN_ALL_CORE
TRANSITION OF CARE PLAN  Patient is self-directing own DC planning; DC to home; spouse YOANNA [phone] will assume care; home care with [facility] per pt. choice; to  f/u with LESLIE GAMBINO MD post DC;  will arrange own transport at DC.  Patient was instructed to obtain medical clearance prior to driving, verbalized understanding; Discussed/ explained [facility] Transitional Care Management Post DC Engagement assistance; verbalized understanding. Accepted by [facility]; patient is requesting previous PT; 
yes

## 2024-10-30 NOTE — CARE COORDINATION ASSESSMENT. - NSPASTMEDSURGHISTORY_GEN_ALL_CORE_FT
PAST MEDICAL & SURGICAL HISTORY:  Depression      History of hypothyroidism      H/O bilateral hip replacements  Left 2004 Right 20019      History of hysterectomy  1995      H/O bariatric surgery  lap band 2003      Hypertension      [place] (hard of hearing)      HLD (hyperlipidemia)      Morbid obesity  s/p gastric lap      OA (osteoarthritis)      S/P total knee replacement, right  2019      H/O hernia repair  Umbilical 2009      History of abdominoplasty  Panniculectomy      Gum hypertrophy      H/O urinary incontinence      Cardiac arrhythmia      Internal bleeding      H/O osteoporosis      H/O sleep apnea      S/P revision of total hip      S/P total knee replacement, left

## 2024-10-30 NOTE — OCCUPATIONAL THERAPY INITIAL EVALUATION ADULT - ADDITIONAL COMMENTS
Pt lives w/ spouse in a private home, pt is unclear with the number of stairs she will have to negotiate upon d/c, she has stated 1 AYAKA, 3-4 without railings or 8. no steps inside the home. +stall shower w/ shower chair. PTA pt required assistance for UB dressing 2* limited ER/IR of Left UE. Pt reports she will sleep in a recliner upon d/c

## 2024-10-30 NOTE — PROGRESS NOTE ADULT - SUBJECTIVE AND OBJECTIVE BOX
Patient is a 73y old  Female who presents with a chief complaint of lumbar stenosis (29 Oct 2024 16:02)      INTERVAL HPI/OVERNIGHT EVENTS:  Patient seen and examined in am, feels well, able to ambulate with walker, pain is well controlled. Denies dizziness fever, chills, nausea, vomiting.     ROS reviewed and is otherwise negative        Vital Signs Last 24 Hrs  T(C): 36.8 (30 Oct 2024 08:05), Max: 37.1 (30 Oct 2024 03:30)  T(F): 98.2 (30 Oct 2024 08:05), Max: 98.7 (30 Oct 2024 03:30)  HR: 59 (30 Oct 2024 08:05) (55 - 80)  BP: 97/61 (30 Oct 2024 08:05) (94/54 - 121/58)  BP(mean): --  RR: 15 (30 Oct 2024 08:05) (11 - 20)  SpO2: 95% (30 Oct 2024 08:05) (94% - 97%)    Parameters below as of 30 Oct 2024 03:30  Patient On (Oxygen Delivery Method): nasal cannula        PHYSICAL EXAM:  GENERAL: NAD, elderly female  HEAD:  Atraumatic, Normocephalic  EYES: EOMI, PERRLA  ENMT: Moist mucous membranes, No lesions; No tonsillar erythema  NECK: Supple, No JVD,   NERVOUS SYSTEM:  Alert & Oriented X3, Good concentration; All 4 extremities mobile, no gross sensory deficits.   CHEST/LUNG: Clear to auscultation bilaterally; No rales, rhonchi, wheezing, or rubs  HEART: Regular rate and rhythm; No murmurs, rubs, or gallops  ABDOMEN: Soft, Nontender, Nondistended; Bowel sounds present  EXTREMITIES:  + Pulses, No clubbing,      MEDICATIONS  (STANDING):  acetaminophen     Tablet .. 1000 milliGRAM(s) Oral every 8 hours  atorvastatin 10 milliGRAM(s) Oral at bedtime  buPROPion XL (24-Hour) . 300 milliGRAM(s) Oral daily  famotidine    Tablet 20 milliGRAM(s) Oral daily  fluticasone propionate 50 MICROgram(s)/spray Nasal Spray 1 Spray(s) Both Nostrils two times a day  lactated ringers. 1000 milliLiter(s) (100 mL/Hr) IV Continuous <Continuous>  levothyroxine 50 MICROGram(s) Oral daily  metoprolol succinate ER 50 milliGRAM(s) Oral daily  pancrelipase  (CREON 24,000 Lipase Units) 2 Capsule(s) Oral three times a day with meals  polyethylene glycol 3350 17 Gram(s) Oral daily  polyethylene glycol 3350 17 Gram(s) Oral daily  senna 2 Tablet(s) Oral at bedtime  sertraline 150 milliGRAM(s) Oral daily    MEDICATIONS  (PRN):  aluminum hydroxide/magnesium hydroxide/simethicone Suspension 30 milliLiter(s) Oral every 12 hours PRN Indigestion  cyclobenzaprine 10 milliGRAM(s) Oral every 8 hours PRN Muscle Spasm  HYDROmorphone  Injectable 0.5 milliGRAM(s) IV Push every 3 hours PRN Breakthrough pain  magnesium hydroxide Suspension 30 milliLiter(s) Oral every 12 hours PRN Constipation  ondansetron Injectable 4 milliGRAM(s) IV Push every 6 hours PRN Nausea and/or Vomiting  oxyCODONE    IR 5 milliGRAM(s) Oral every 3 hours PRN Moderate Pain (4 - 6)  oxyCODONE    IR 10 milliGRAM(s) Oral every 3 hours PRN Severe Pain (7 - 10)      Allergies    penicillins (Rash)  penicillin (Rash)    Intolerances          LABS:                        10.1   7.07  )-----------( 165      ( 30 Oct 2024 08:17 )             31.4     30 Oct 2024 08:17    143    |  106    |  19     ----------------------------<  132    3.4     |  28     |  1.14     Ca    8.7        30 Oct 2024 08:17        Urinalysis Basic - ( 30 Oct 2024 08:17 )    Color: x / Appearance: x / SG: x / pH: x  Gluc: 132 mg/dL / Ketone: x  / Bili: x / Urobili: x   Blood: x / Protein: x / Nitrite: x   Leuk Esterase: x / RBC: x / WBC x   Sq Epi: x / Non Sq Epi: x / Bacteria: x      CAPILLARY BLOOD GLUCOSE          RADIOLOGY & ADDITIONAL TESTS:        Care Discussed with Consultants/Other Providers:    Advanced Directives: [ ] DNR  [ ] No feeding tube  [ ] MOLST in chart  [ ] MOLST completed today  [ ] Unknown

## 2024-10-30 NOTE — DISCHARGE NOTE NURSING/CASE MANAGEMENT/SOCIAL WORK - FINANCIAL ASSISTANCE
[facility] provides services at a reduced cost to those who are determined to be eligible through [facility]’s financial assistance program. Information regarding [facility]’s financial assistance program can be found by going to [email or web address] or by calling [phone].

## 2024-10-31 PROCEDURE — 99232 SBSQ HOSP IP/OBS MODERATE 35: CPT

## 2024-10-31 RX ADMIN — OXYCODONE HYDROCHLORIDE 5 MILLIGRAM(S): 30 TABLET ORAL at 06:26

## 2024-10-31 RX ADMIN — Medication 1000 MILLIGRAM(S): at 06:15

## 2024-10-31 RX ADMIN — Medication 1000 MILLIGRAM(S): at 06:23

## 2024-10-31 RX ADMIN — Medication 1000 MILLIGRAM(S): at 21:12

## 2024-10-31 RX ADMIN — Medication 10 MILLIGRAM(S): at 21:12

## 2024-10-31 RX ADMIN — PANCRELIPASE 2 CAPSULE(S): 16800; 98400; 56800 CAPSULE, DELAYED RELEASE ORAL at 12:29

## 2024-10-31 RX ADMIN — Medication 300 MILLIGRAM(S): at 12:29

## 2024-10-31 RX ADMIN — Medication 50 MICROGRAM(S): at 06:16

## 2024-10-31 RX ADMIN — SERTRALINE HYDROCHLORIDE 150 MILLIGRAM(S): 50 TABLET, FILM COATED ORAL at 12:29

## 2024-10-31 RX ADMIN — Medication 1000 MILLIGRAM(S): at 15:08

## 2024-10-31 RX ADMIN — Medication 1000 MILLIGRAM(S): at 16:14

## 2024-10-31 RX ADMIN — Medication 2 TABLET(S): at 21:12

## 2024-10-31 RX ADMIN — FLUTICASONE PROPIONATE 1 SPRAY(S): 50 SPRAY, METERED NASAL at 06:16

## 2024-10-31 RX ADMIN — FAMOTIDINE 20 MILLIGRAM(S): 10 INJECTION INTRAVENOUS at 12:30

## 2024-10-31 RX ADMIN — Medication 1000 MILLIGRAM(S): at 22:24

## 2024-10-31 RX ADMIN — ONDANSETRON HYDROCHLORIDE 4 MILLIGRAM(S): 2 INJECTION, SOLUTION INTRAMUSCULAR; INTRAVENOUS at 21:21

## 2024-10-31 RX ADMIN — POLYETHYLENE GLYCOL 3350 17 GRAM(S): 17 POWDER, FOR SOLUTION ORAL at 12:29

## 2024-10-31 RX ADMIN — OXYCODONE HYDROCHLORIDE 5 MILLIGRAM(S): 30 TABLET ORAL at 06:56

## 2024-10-31 NOTE — CONSULT NOTE ADULT - ASSESSMENT
74 yo female, pmh of depression, hypertension, obesity s/p gastric lap, hypothyroidism, presenting for lumbar stenosis s/p lumbar laminectomy with pedicle screw fusion.  s/p L3-L5 posterior spinal fusion POD # 1    Today when she was trying to get out of bed, she was not able to  - hence neurology consult was called.  No facial asymmetry  No lateralized weakness.  No c/o difficulty speaking.  No shaking or seizures.    No signs of CVA.     Continue PT.  No further neuro W/up is warranted at this time.    D/w pt. Questions answered.  D/w Ortho PA on phone.     Would follow.

## 2024-10-31 NOTE — SOCIAL WORK PROGRESS NOTE - NSSWPROGRESSNOTE_GEN_ALL_CORE
YANELI spoke with Venessa in admitting at [facility], medically accepted but she does not have a bed for today, unsure if bed will be available on Friday but she will keep patient in mind. YANELI referred to Senthil who said she was doing too well but I asked them to review today's PT note since she did not do as well today and that's why recommended rehab. patient was also referred to AR at [facility] YANELI spoke with Vneessa in admitting at [facility], medically accepted but she does not have a bed for today, unsure if bed will be available on Friday but she will keep patient in mind. YANELI referred to [facility] for tomorrow and medically accepted for [facility] for Friday still waiting for AR at [facility] to review YANELI spoke with Venessa in admitting at [facility], medically accepted but she does not have a bed for today, unsure if bed will be available on Friday but she will keep patient in mind. YANELI referred to [facility] for tomorrow and medically accepted for [facility] for Friday still waiting for AR at [facility] and [facility] accepted for Friday

## 2024-10-31 NOTE — PROGRESS NOTE ADULT - ASSESSMENT
72 yo female, pmh of depression, hypertension, hld, obesity s/p gastric lap on pancreatic enzymes, hypothyroidism, presenting for lumbar stenosis s/p lumbar laminectomy with pedicle screw fusion.      lumbar stenosis s/p laminectomy, pedicle screw fusion  - pain control - standing tylenol 1000 q8h,  - cyclobenzaprine 10 tid prn for muscle spasm  oxycodone 5/10 prn for mod/severe pain   - bowel regimen   - pt/ot  - no medical contraindication to DC pending PT clearance, may need RICARDO if having persistent issues ambulating    HTN - continue to hold home hydrochlorothiazide, continue toprol with holding parameters - currently held given borderline hypotension    hypothyroidism - continue synthroid 50 mcg daily    depression - continue buproprion, sertraline    s/p gastric lap - continue creon    hld - continue lipitor    gi ppx - pepcid

## 2024-10-31 NOTE — PROGRESS NOTE ADULT - SUBJECTIVE AND OBJECTIVE BOX
Post Op     LUBA DESIR      73y        Female                                                                                                                 T(C): 36.8 (10-31-24 @ 08:25), Max: 37.6 (10-30-24 @ 23:19)  HR: 62 (10-31-24 @ 08:25) (62 - 72)  BP: 96/47 (10-31-24 @ 08:25) (95/59 - 104/65)  RR: 17 (10-31-24 @ 08:25) (17 - 18)  SpO2: 95% (10-31-24 @ 08:25) (91% - 97%)  Wt(kg): --    S/P lumbar fusion     Patient denies shortness of breath, chest pain, dyspnea, No complaints  Pain is 3 /10  pt /ot states  trouble with balance  when discussed with patient   Physical Exam    Extremity: Bilaterally:  No holmon                                           No Cord                                          PAS on                                          Neurovascular intact                                          Motor intact EHL/FHL                                          Sensation intact                                          Pulses intact DP/PT                                         Calves Soft                                         Dressing Clean / Dry / Intact                                          Capillary refill with 5 seconds  postive  strength   bilaterally no foot drop  flex and extends at hip and knee    patient states feels better today                        10.1   7.07  )-----------( 165      ( 30 Oct 2024 08:17 )             31.4       10-30    143  |  106  |  19  ----------------------------<  132[H]  3.4[L]   |  28  |  1.14    Ca    8.7      30 Oct 2024 08:17        A/P  -- S/P fusion lumbar  ? balance issues   as per Dr. canas neuro consult dr Ortiz called    -  Medicine To Follow   - DVT prophylaxis PAS  - PT & OT   - Analagesia  - Incentive Spirometry  - Discharge Planning  - Safety Precautions  -  CBC , BMP daily

## 2024-10-31 NOTE — CONSULT NOTE ADULT - SUBJECTIVE AND OBJECTIVE BOX
Patient is a 73y old  Female who presents with a chief complaint of spine sx (30 Oct 2024 14:57)      HPI: 72 yo female, pmh of depression, hypertension, obesity s/p gastric lap, hypothyroidism, presenting for lumbar stenosis s/p lumbar laminectomy with pedicle screw fusion.  s/p L3-L5 posterior spinal fusion POD # 1    DRAFT        PAST MEDICAL & SURGICAL HISTORY:  History of hypothyroidism      Depression      Hypertension      OA (osteoarthritis)      Morbid obesity  s/p gastric lap      HLD (hyperlipidemia)      [place] (hard of hearing)      H/O sleep apnea      H/O osteoporosis      Internal bleeding      Cardiac arrhythmia      H/O urinary incontinence      Gum hypertrophy      H/O bariatric surgery  lap band 2003      History of hysterectomy  1995      H/O bilateral hip replacements  Left 2004 Right 20019      H/O hernia repair  Umbilical 2009      S/P total knee replacement, right  2019      History of abdominoplasty  Panniculectomy      S/P total knee replacement, left      S/P revision of total hip          MEDICATIONS  (STANDING):  acetaminophen     Tablet .. 1000 milliGRAM(s) Oral every 8 hours  atorvastatin 10 milliGRAM(s) Oral at bedtime  buPROPion XL (24-Hour) . 300 milliGRAM(s) Oral daily  famotidine    Tablet 20 milliGRAM(s) Oral daily  fluticasone propionate 50 MICROgram(s)/spray Nasal Spray 1 Spray(s) Both Nostrils two times a day  levothyroxine 50 MICROGram(s) Oral daily  metoprolol succinate ER 50 milliGRAM(s) Oral daily  pancrelipase  (CREON 24,000 Lipase Units) 2 Capsule(s) Oral three times a day with meals  polyethylene glycol 3350 17 Gram(s) Oral daily  polyethylene glycol 3350 17 Gram(s) Oral daily  senna 2 Tablet(s) Oral at bedtime  sertraline 150 milliGRAM(s) Oral daily    MEDICATIONS  (PRN):  aluminum hydroxide/magnesium hydroxide/simethicone Suspension 30 milliLiter(s) Oral every 12 hours PRN Indigestion  cyclobenzaprine 10 milliGRAM(s) Oral every 8 hours PRN Muscle Spasm  HYDROmorphone  Injectable 0.5 milliGRAM(s) IV Push every 3 hours PRN Breakthrough pain  magnesium hydroxide Suspension 30 milliLiter(s) Oral every 12 hours PRN Constipation  ondansetron Injectable 4 milliGRAM(s) IV Push every 6 hours PRN Nausea and/or Vomiting  oxyCODONE    IR 5 milliGRAM(s) Oral every 3 hours PRN Moderate Pain (4 - 6)  oxyCODONE    IR 10 milliGRAM(s) Oral every 3 hours PRN Severe Pain (7 - 10)      Allergies    penicillins (Rash)  penicillin (Rash)    Intolerances        SOCIAL HISTORY:    No h/o Smoking.   No h/o alcohol use.  Ex Smoker. Quite in past.  Pt smokes.  Pt does drink socially.  Pt drinks alcohol heavily.    FAMILY HISTORY:  FH: CHF (congestive heart failure) (Father)    FH: chronic kidney disease (Mother)    FH: chronic kidney disease (Sibling)        REVIEW OF SYSTEMS:    CONSTITUTIONAL: No fever  EYES: No eye pain,   ENMT:  No sinus or throat pain  NECK: No pain or stiffness  RESPIRATORY: No cough, No hemoptysis; No shortness of breath  CARDIOVASCULAR: No acute chest pain, palpitations,  or leg swelling  GASTROINTESTINAL: No abdominal pain. No nausea, vomiting, or hematemesis;  No melena or hematochezia.  GENITOURINARY: No  hematuria, or incontinence  MUSCULOSKELETAL: No joint swelling; No extremity pain  SKIN: No itching, rashes, or lesions   LYMPH NODES: No enlarged glands  NEUROLOGICAL: No headaches, memory loss,   PSYCHIATRIC: No depression, anxiety, mood swings, or difficulty sleeping  ENDOCRINE: No heat or cold intolerance;   HEME/LYMPH: No easy bruising, or bleeding gums  Allergy/Immunology. No medication allergy. No seasonal allergies.    PHYSICAL EXAM:  Vital Signs Last 24 Hrs  T(F): 98.3 (10-31-24 @ 08:25)  HR: 62 (10-31-24 @ 08:25)  BP: 96/47 (10-31-24 @ 08:25)  RR: 17 (10-31-24 @ 08:25)    GENERAL: NAD, well-groomed, well-developed  HEAD:  Atraumatic, Normocephalic  EYES: EOMI, PERRLA, conjunctiva and sclera clear  NECK: Supple, No JVD, thyroid non-palpable    On Neurological Examination:    Mental Status - Pt is alert, awake, oriented X3. Higher functions are intact. Pt. does have mild poor cognition. Follows commands well and able to answer questions appropriately.    Speech -  Normal. Slurred. Pt has no aphasia.    Cranial Nerves - Pupils 3 mm equal and reactive to light, extraocular eye movements intact. Pt has no visual field deficit.  Pt has no right left facial asymmetry. Tongue - is in midline.    Motor Exam - 4 plus/5 all over, No drift. No shaking or tremors.  Muscle tone - is normal all over. Moves all extremities equally. No asymmetry is seen.      Sensory Exam - Pin prick, temperature, joint position and vibration are intact on either side. Pt withdraws all extremities equally on stimulation. No asymmetry seen. No complaints of tingling, numbness.    Gait - Able to stand and walk unassisted. Pt is able to stand up with holding my hands and is able to walk for few feet around the bed. Not falling to either side.    Deep tendon Reflexes - 2 plus all over.    Coordination - Fine finger movements are normal on both sides. Finger to nose is also normal on both sides.       Romberg - Negative.    Neck Supple -  Yes.    LABS:                        10.1   7.07  )-----------( 165      ( 30 Oct 2024 08:17 )             31.4     10-30    143  |  106  |  19  ----------------------------<  132[H]  3.4[L]   |  28  |  1.14    Ca    8.7      30 Oct 2024 08:17        Urinalysis Basic - ( 30 Oct 2024 08:17 )    Color: x / Appearance: x / SG: x / pH: x  Gluc: 132 mg/dL / Ketone: x  / Bili: x / Urobili: x   Blood: x / Protein: x / Nitrite: x   Leuk Esterase: x / RBC: x / WBC x   Sq Epi: x / Non Sq Epi: x / Bacteria: x    RADIOLOGY & ADDITIONAL STUDIES:         Patient is a 73y old  Female who presents with a chief complaint of spine sx (30 Oct 2024 14:57)    HPI: 72 yo female, pmh of depression, hypertension, obesity s/p gastric lap, hypothyroidism, presenting for lumbar stenosis s/p lumbar laminectomy with pedicle screw fusion.  s/p L3-L5 posterior spinal fusion POD # 1    Today when she was trying to get out of bed, she was not able to  - hence neurology consult was called.  No facial asymmetry  No lateralized weakness.  No c/o difficulty speaking.  No shaking or seizures     PAST MEDICAL & SURGICAL HISTORY:    History of hypothyroidism    Depression    Hypertension    OA (osteoarthritis)    Morbid obesity  s/p gastric lap    HLD (hyperlipidemia)    [place] (hard of hearing)    H/O sleep apnea    H/O osteoporosis    Internal bleeding    Cardiac arrhythmia    H/O urinary incontinence    Gum hypertrophy    H/O bariatric surgery  lap band 2003    History of hysterectomy  1995    H/O bilateral hip replacements  Left 2004 Right 20019    H/O hernia repair  Umbilical 2009    S/P total knee replacement, right  2019    History of abdominoplasty  Panniculectomy    S/P total knee replacement, left    S/P revision of total hip    MEDICATIONS  (STANDING):  acetaminophen     Tablet .. 1000 milliGRAM(s) Oral every 8 hours  atorvastatin 10 milliGRAM(s) Oral at bedtime  buPROPion XL (24-Hour) . 300 milliGRAM(s) Oral daily  famotidine    Tablet 20 milliGRAM(s) Oral daily  fluticasone propionate 50 MICROgram(s)/spray Nasal Spray 1 Spray(s) Both Nostrils two times a day  levothyroxine 50 MICROGram(s) Oral daily  metoprolol succinate ER 50 milliGRAM(s) Oral daily  pancrelipase  (CREON 24,000 Lipase Units) 2 Capsule(s) Oral three times a day with meals  polyethylene glycol 3350 17 Gram(s) Oral daily  polyethylene glycol 3350 17 Gram(s) Oral daily  senna 2 Tablet(s) Oral at bedtime  sertraline 150 milliGRAM(s) Oral daily    MEDICATIONS  (PRN):  aluminum hydroxide/magnesium hydroxide/simethicone Suspension 30 milliLiter(s) Oral every 12 hours PRN Indigestion  cyclobenzaprine 10 milliGRAM(s) Oral every 8 hours PRN Muscle Spasm  HYDROmorphone  Injectable 0.5 milliGRAM(s) IV Push every 3 hours PRN Breakthrough pain  magnesium hydroxide Suspension 30 milliLiter(s) Oral every 12 hours PRN Constipation  ondansetron Injectable 4 milliGRAM(s) IV Push every 6 hours PRN Nausea and/or Vomiting  oxyCODONE    IR 5 milliGRAM(s) Oral every 3 hours PRN Moderate Pain (4 - 6)  oxyCODONE    IR 10 milliGRAM(s) Oral every 3 hours PRN Severe Pain (7 - 10)    Allergies    penicillins (Rash)    SOCIAL HISTORY:    No h/o Smoking.   No h/o alcohol use.    FAMILY HISTORY:    FH: CHF (congestive heart failure) (Father)    FH: chronic kidney disease (Mother)    FH: chronic kidney disease (Sibling)    REVIEW OF SYSTEMS:    CONSTITUTIONAL: No fever  EYES: No eye pain,   ENMT:  No sinus or throat pain  NECK: No pain or stiffness  RESPIRATORY: No cough, No hemoptysis; No shortness of breath  CARDIOVASCULAR: No acute chest pain, palpitations,  or leg swelling  GASTROINTESTINAL: No abdominal pain. No nausea, vomiting, or hematemesis;  No melena or hematochezia.  GENITOURINARY: No  hematuria, or incontinence  MUSCULOSKELETAL: No joint swelling; Had Lumbar sx  SKIN: No itching, rashes, or lesions   LYMPH NODES: No enlarged glands  NEUROLOGICAL: No headaches, memory loss,   PSYCHIATRIC: No depression, anxiety, mood swings, or difficulty sleeping  ENDOCRINE: No heat or cold intolerance;   HEME/LYMPH: No easy bruising, or bleeding gums  Allergy/Immunology. Medication allergies reviewed    PHYSICAL EXAM:  Vital Signs Last 24 Hrs  T(F): 98.3 (10-31-24 @ 08:25)  HR: 62 (10-31-24 @ 08:25)  BP: 96/47 (10-31-24 @ 08:25)  RR: 17 (10-31-24 @ 08:25)    GENERAL: NAD, well-groomed, well-developed  HEAD:  Atraumatic, Normocephalic  EYES: EOMI, PERRLA, conjunctiva and sclera clear  NECK: Supple, No JVD    On Neurological Examination:    Mental Status - Pt is alert, awake, oriented X3. Higher functions are intact. Follows commands well and able to answer questions appropriately.    Speech -  Normal. Pt has no aphasia.    Cranial Nerves - Pupils 3 mm equal and reactive to light, extraocular eye movements intact. Pt has no visual field deficit.  No facial asymmetry. Tongue - is in midline.    Motor Exam - 4/5 all over, No drift. No shaking or tremors.     Sensory Exam -  Pt withdraws all extremities equally on stimulation. No complaints of tingling, numbness.    Gait - To be tested with PT to prevent fall    Deep tendon Reflexes - 2 plus all over.    Coordination - Fine finger movements are normal on both sides. Finger to nose is also normal on both sides.       Neck Supple -  Yes.    LABS:                        10.1   7.07  )-----------( 165      ( 30 Oct 2024 08:17 )             31.4     10-30    143  |  106  |  19  ----------------------------<  132[H]  3.4[L]   |  28  |  1.14    Ca    8.7      30 Oct 2024 08:17    Urinalysis Basic - ( 30 Oct 2024 08:17 )    Color: x / Appearance: x / SG: x / pH: x  Gluc: 132 mg/dL / Ketone: x  / Bili: x / Urobili: x   Blood: x / Protein: x / Nitrite: x   Leuk Esterase: x / RBC: x / WBC x   Sq Epi: x / Non Sq Epi: x / Bacteria: x    RADIOLOGY & ADDITIONAL STUDIES:

## 2024-10-31 NOTE — PHARMACOTHERAPY INTERVENTION NOTE - COMMENTS
Admission medication reconciliation POD1  
Transition of Care video discharge education - medication calendar given to patient  Arlen Laura, PharmD Candidate for Patricia Dupree, PharmD.

## 2024-10-31 NOTE — PROGRESS NOTE ADULT - SUBJECTIVE AND OBJECTIVE BOX
Patient is a 73y old  Female who presents with a chief complaint of spine sx (30 Oct 2024 14:57)      INTERVAL HPI/OVERNIGHT EVENTS:  Patient seen and examined, having some stiffness, wants to get up and ambulate, no dizziness, fever, chills, nausea.     ROS reviewed and is otherwise negative        Vital Signs Last 24 Hrs  T(C): 36.8 (31 Oct 2024 08:25), Max: 37.6 (30 Oct 2024 23:19)  T(F): 98.3 (31 Oct 2024 08:25), Max: 99.7 (30 Oct 2024 23:19)  HR: 62 (31 Oct 2024 08:25) (62 - 72)  BP: 96/47 (31 Oct 2024 08:25) (95/59 - 104/65)  BP(mean): --  RR: 17 (31 Oct 2024 08:25) (17 - 18)  SpO2: 95% (31 Oct 2024 08:25) (91% - 97%)    Parameters below as of 31 Oct 2024 08:25  Patient On (Oxygen Delivery Method): room air        PHYSICAL EXAM:  GENERAL: NAD, well-groomed, elderly female  HEAD:  Atraumatic, Normocephalic  EYES: EOMI, PERRLA  ENMT: Moist mucous membranes, No lesions;   NECK: Supple, No JVD  NERVOUS SYSTEM:  Alert & Oriented X3, Good concentration; All 4 extremities mobile,  CHEST/LUNG: Clear to auscultation bilaterally; No rales, rhonchi, wheezing, or rubs  HEART: Regular rate and rhythm; No murmurs, rubs, or gallops  ABDOMEN: Soft, Nontender, Nondistended; Bowel sounds present  EXTREMITIES:  + Pulses, No clubbing,  SKIN: lumbar incision bandage c/d/i    MEDICATIONS  (STANDING):  acetaminophen     Tablet .. 1000 milliGRAM(s) Oral every 8 hours  atorvastatin 10 milliGRAM(s) Oral at bedtime  buPROPion XL (24-Hour) . 300 milliGRAM(s) Oral daily  famotidine    Tablet 20 milliGRAM(s) Oral daily  fluticasone propionate 50 MICROgram(s)/spray Nasal Spray 1 Spray(s) Both Nostrils two times a day  levothyroxine 50 MICROGram(s) Oral daily  metoprolol succinate ER 50 milliGRAM(s) Oral daily  pancrelipase  (CREON 24,000 Lipase Units) 2 Capsule(s) Oral three times a day with meals  polyethylene glycol 3350 17 Gram(s) Oral daily  polyethylene glycol 3350 17 Gram(s) Oral daily  senna 2 Tablet(s) Oral at bedtime  sertraline 150 milliGRAM(s) Oral daily    MEDICATIONS  (PRN):  aluminum hydroxide/magnesium hydroxide/simethicone Suspension 30 milliLiter(s) Oral every 12 hours PRN Indigestion  cyclobenzaprine 10 milliGRAM(s) Oral every 8 hours PRN Muscle Spasm  HYDROmorphone  Injectable 0.5 milliGRAM(s) IV Push every 3 hours PRN Breakthrough pain  magnesium hydroxide Suspension 30 milliLiter(s) Oral every 12 hours PRN Constipation  ondansetron Injectable 4 milliGRAM(s) IV Push every 6 hours PRN Nausea and/or Vomiting  oxyCODONE    IR 5 milliGRAM(s) Oral every 3 hours PRN Moderate Pain (4 - 6)  oxyCODONE    IR 10 milliGRAM(s) Oral every 3 hours PRN Severe Pain (7 - 10)      Allergies    penicillins (Rash)  penicillin (Rash)    Intolerances          LABS:      Ca    8.7        30 Oct 2024 08:17        Urinalysis Basic - ( 30 Oct 2024 08:17 )    Color: x / Appearance: x / SG: x / pH: x  Gluc: 132 mg/dL / Ketone: x  / Bili: x / Urobili: x   Blood: x / Protein: x / Nitrite: x   Leuk Esterase: x / RBC: x / WBC x   Sq Epi: x / Non Sq Epi: x / Bacteria: x      CAPILLARY BLOOD GLUCOSE          RADIOLOGY & ADDITIONAL TESTS:        Care Discussed with Consultants/Other Providers:    Advanced Directives: [ ] DNR  [ ] No feeding tube  [ ] MOLST in chart  [ ] MOLST completed today  [ ] Unknown

## 2024-10-31 NOTE — SOCIAL WORK PROGRESS NOTE - NSSWPROGRESSNOTE_GEN_ALL_CORE
Per IDR patient now recommended for rehab. I met with patient and spouse bedside. ortho surgeon bedside as well. SW explained inpatient rehab and patient asking about AR. SW explained both RICARDO and AR. I spoke with PT who felt either could be appropriate. Patient and spouse requested AR at [facility]. For RICARDO they wanted [facility]. SW explained need for 3 night stay for RICARDO. Ambulette explained and per surgeon, he wanted neurologist to assess patient before she leaves for rehab. Plan for rehab on Friday IMM provided and explained

## 2024-11-01 VITALS
HEART RATE: 71 BPM | DIASTOLIC BLOOD PRESSURE: 65 MMHG | SYSTOLIC BLOOD PRESSURE: 104 MMHG | OXYGEN SATURATION: 96 % | RESPIRATION RATE: 16 BRPM | TEMPERATURE: 99 F

## 2024-11-01 PROCEDURE — 99232 SBSQ HOSP IP/OBS MODERATE 35: CPT

## 2024-11-01 RX ADMIN — Medication 1000 MILLIGRAM(S): at 13:24

## 2024-11-01 RX ADMIN — Medication 50 MICROGRAM(S): at 05:41

## 2024-11-01 RX ADMIN — Medication 1000 MILLIGRAM(S): at 05:41

## 2024-11-01 RX ADMIN — POLYETHYLENE GLYCOL 3350 17 GRAM(S): 17 POWDER, FOR SOLUTION ORAL at 11:51

## 2024-11-01 RX ADMIN — FLUTICASONE PROPIONATE 1 SPRAY(S): 50 SPRAY, METERED NASAL at 05:41

## 2024-11-01 RX ADMIN — PANCRELIPASE 2 CAPSULE(S): 16800; 98400; 56800 CAPSULE, DELAYED RELEASE ORAL at 11:51

## 2024-11-01 RX ADMIN — SERTRALINE HYDROCHLORIDE 150 MILLIGRAM(S): 50 TABLET, FILM COATED ORAL at 11:50

## 2024-11-01 RX ADMIN — Medication 1000 MILLIGRAM(S): at 13:56

## 2024-11-01 RX ADMIN — Medication 1000 MILLIGRAM(S): at 05:45

## 2024-11-01 RX ADMIN — Medication 50 MILLIGRAM(S): at 05:41

## 2024-11-01 RX ADMIN — Medication 300 MILLIGRAM(S): at 11:51

## 2024-11-01 RX ADMIN — FAMOTIDINE 20 MILLIGRAM(S): 10 INJECTION INTRAVENOUS at 11:50

## 2024-11-01 NOTE — PROGRESS NOTE ADULT - ASSESSMENT
74 yo female, pmh of depression, hypertension, obesity s/p gastric lap, hypothyroidism, presenting for lumbar stenosis s/p lumbar laminectomy with pedicle screw fusion.  s/p L3-L5 posterior spinal fusion POD # 1    Today when she was trying to get out of bed, she was not able to  - hence neurology consult was called.  No facial asymmetry  No lateralized weakness.  No c/o difficulty speaking.  No shaking or seizures.    No signs of CVA.     Continue PT.  No further neuro W/up is warranted at this time.    D/w pt. Questions answered.  D/w Ortho PA on phone.     Would follow.    72 yo female, pmh of depression, hypertension, obesity s/p gastric lap, hypothyroidism, presenting for lumbar stenosis s/p lumbar laminectomy with pedicle screw fusion.  s/p L3-L5 posterior spinal fusion POD # 2    Today when she was trying to get out of bed, she was not able to  - hence neurology consult was called.  No facial asymmetry  No lateralized weakness.  No c/o difficulty speaking.  No shaking or seizures.    No signs of CVA.     Continue PT.  No further neuro W/up is warranted at this time.    D/w pt. Questions answered.

## 2024-11-01 NOTE — PROGRESS NOTE ADULT - SUBJECTIVE AND OBJECTIVE BOX
POST OPERATIVE DAY #:  3  STATUS POST: L3-L5 posterior spinal fusion    SUBJECTIVE: Patient seen and examined at bedside. Denies nausea, vomiting, diarrhea, chest pain, shortness of breath, headache, dizziness.    Pain (0-10): 2/10      OBJECTIVE:     Vital Signs Last 24 Hrs  T(C): 37 (01 Nov 2024 07:57), Max: 37.6 (31 Oct 2024 23:07)  T(F): 98.6 (01 Nov 2024 07:57), Max: 99.6 (31 Oct 2024 23:07)  HR: 71 (01 Nov 2024 07:57) (66 - 75)  BP: 104/65 (01 Nov 2024 07:57) (104/65 - 131/74)  BP(mean): --  RR: 16 (01 Nov 2024 07:57) (16 - 16)  SpO2: 96% (01 Nov 2024 07:57) (92% - 96%)    Parameters below as of 01 Nov 2024 07:57  Patient On (Oxygen Delivery Method): room air                   Spine          Dressing:  clean/dry/intact           Sensation:  intact to light touch           Motor exam:           Lower extremity                   HF         HAb       HAd       KF          KE         DF     PF     Ev       Inv     EHL                                               R        5/5        5/5          5/5       5/5         5/5       5/5   5/5    5/5     5/5     5/5                                               L         5/5        5/5          5/5       5/5         5/5       5/5   5/5    5/5     5/5     5/5           Calves supple and NT                                              2+ DP and PT pulses          SCDs in place             A/P :  73y Female, stable,  s/p L3-L5 posterior spinal fusion POD # 3  -    Pain control  -    DVT ppx: SCDs    -    Encourage Incentive Spirometry  -    Physical Therapy  -    Occupational Therapy  -    Weight bearing status: WBAT  -    Discharge Plan: rehab today pending placement

## 2024-11-01 NOTE — SOCIAL WORK PROGRESS NOTE - NSSWPROGRESSNOTE_GEN_ALL_CORE
Per [facility] admissions, bed is not available and [facility] is still reviewing pt. Pt agreed to accepting bed at [facility] today and is scheduled for dc to [facility] at 2 pm via [facility]. Pt, Tx team and [facility] aware.

## 2024-11-01 NOTE — PROGRESS NOTE ADULT - SUBJECTIVE AND OBJECTIVE BOX
Patient is a 73y old  Female who presents with a chief complaint of spine sx (30 Oct 2024 14:57)    HPI: 72 yo female, pmh of depression, hypertension, obesity s/p gastric lap, hypothyroidism, presenting for lumbar stenosis s/p lumbar laminectomy with pedicle screw fusion.  s/p L3-L5 posterior spinal fusion POD # 1    Today when she was trying to get out of bed, she was not able to  - hence neurology consult was called.  No facial asymmetry  No lateralized weakness.  No c/o difficulty speaking.  No shaking or seizures     PAST MEDICAL & SURGICAL HISTORY:    History of hypothyroidism    Depression    Hypertension    OA (osteoarthritis)    Morbid obesity  s/p gastric lap    HLD (hyperlipidemia)    [place] (hard of hearing)    H/O sleep apnea    H/O osteoporosis    Internal bleeding    Cardiac arrhythmia    H/O urinary incontinence    Gum hypertrophy    H/O bariatric surgery  lap band 2003    History of hysterectomy  1995    H/O bilateral hip replacements  Left 2004 Right 20019    H/O hernia repair  Umbilical 2009    S/P total knee replacement, right  2019    History of abdominoplasty  Panniculectomy    S/P total knee replacement, left    S/P revision of total hip    MEDICATIONS  (STANDING):  acetaminophen     Tablet .. 1000 milliGRAM(s) Oral every 8 hours  atorvastatin 10 milliGRAM(s) Oral at bedtime  buPROPion XL (24-Hour) . 300 milliGRAM(s) Oral daily  famotidine    Tablet 20 milliGRAM(s) Oral daily  fluticasone propionate 50 MICROgram(s)/spray Nasal Spray 1 Spray(s) Both Nostrils two times a day  levothyroxine 50 MICROGram(s) Oral daily  metoprolol succinate ER 50 milliGRAM(s) Oral daily  pancrelipase  (CREON 24,000 Lipase Units) 2 Capsule(s) Oral three times a day with meals  polyethylene glycol 3350 17 Gram(s) Oral daily  polyethylene glycol 3350 17 Gram(s) Oral daily  senna 2 Tablet(s) Oral at bedtime  sertraline 150 milliGRAM(s) Oral daily    MEDICATIONS  (PRN):  aluminum hydroxide/magnesium hydroxide/simethicone Suspension 30 milliLiter(s) Oral every 12 hours PRN Indigestion  cyclobenzaprine 10 milliGRAM(s) Oral every 8 hours PRN Muscle Spasm  HYDROmorphone  Injectable 0.5 milliGRAM(s) IV Push every 3 hours PRN Breakthrough pain  magnesium hydroxide Suspension 30 milliLiter(s) Oral every 12 hours PRN Constipation  ondansetron Injectable 4 milliGRAM(s) IV Push every 6 hours PRN Nausea and/or Vomiting  oxyCODONE    IR 5 milliGRAM(s) Oral every 3 hours PRN Moderate Pain (4 - 6)  oxyCODONE    IR 10 milliGRAM(s) Oral every 3 hours PRN Severe Pain (7 - 10)    Allergies    penicillins (Rash)    SOCIAL HISTORY:    No h/o Smoking.   No h/o alcohol use.    FAMILY HISTORY:    FH: CHF (congestive heart failure) (Father)    FH: chronic kidney disease (Mother)    FH: chronic kidney disease (Sibling)    REVIEW OF SYSTEMS:    CONSTITUTIONAL: No fever  EYES: No eye pain,   ENMT:  No sinus or throat pain  NECK: No pain or stiffness  RESPIRATORY: No cough, No hemoptysis; No shortness of breath  CARDIOVASCULAR: No acute chest pain, palpitations,  or leg swelling  GASTROINTESTINAL: No abdominal pain. No nausea, vomiting, or hematemesis;  No melena or hematochezia.  GENITOURINARY: No  hematuria, or incontinence  MUSCULOSKELETAL: No joint swelling; Had Lumbar sx  SKIN: No itching, rashes, or lesions   LYMPH NODES: No enlarged glands  NEUROLOGICAL: No headaches, memory loss,   PSYCHIATRIC: No depression, anxiety, mood swings, or difficulty sleeping  ENDOCRINE: No heat or cold intolerance;   HEME/LYMPH: No easy bruising, or bleeding gums  Allergy/Immunology. Medication allergies reviewed    PHYSICAL EXAM:  Vital Signs Last 24 Hrs  T(F): 98.3 (10-31-24 @ 08:25)  HR: 62 (10-31-24 @ 08:25)  BP: 96/47 (10-31-24 @ 08:25)  RR: 17 (10-31-24 @ 08:25)    GENERAL: NAD, well-groomed, well-developed  HEAD:  Atraumatic, Normocephalic  EYES: EOMI, PERRLA, conjunctiva and sclera clear  NECK: Supple, No JVD    On Neurological Examination:    Mental Status - Pt is alert, awake, oriented X3. Higher functions are intact. Follows commands well and able to answer questions appropriately.    Speech -  Normal. Pt has no aphasia.    Cranial Nerves - Pupils 3 mm equal and reactive to light, extraocular eye movements intact. Pt has no visual field deficit.  No facial asymmetry. Tongue - is in midline.    Motor Exam - 4/5 all over, No drift. No shaking or tremors.     Sensory Exam -  Pt withdraws all extremities equally on stimulation. No complaints of tingling, numbness.    Gait - To be tested with PT to prevent fall    Deep tendon Reflexes - 2 plus all over.    Coordination - Fine finger movements are normal on both sides. Finger to nose is also normal on both sides.       Neck Supple -  Yes.    LABS:                        10.1   7.07  )-----------( 165      ( 30 Oct 2024 08:17 )             31.4     10-30    143  |  106  |  19  ----------------------------<  132[H]  3.4[L]   |  28  |  1.14    Ca    8.7      30 Oct 2024 08:17    Urinalysis Basic - ( 30 Oct 2024 08:17 )    Color: x / Appearance: x / SG: x / pH: x  Gluc: 132 mg/dL / Ketone: x  / Bili: x / Urobili: x   Blood: x / Protein: x / Nitrite: x   Leuk Esterase: x / RBC: x / WBC x   Sq Epi: x / Non Sq Epi: x / Bacteria: x    RADIOLOGY & ADDITIONAL STUDIES:         Patient is a 73y old  Female who presents with a chief complaint of spine sx (30 Oct 2024 14:57)    HPI: 74 yo female, pmh of depression, hypertension, obesity s/p gastric lap, hypothyroidism, presenting for lumbar stenosis s/p lumbar laminectomy with pedicle screw fusion.  s/p L3-L5 posterior spinal fusion POD # 1    Today when she was trying to get out of bed, she was not able to  - hence neurology consult was called.  No facial asymmetry  No lateralized weakness.  No c/o difficulty speaking.  No shaking or seizures       MEDICATIONS  (STANDING):    acetaminophen     Tablet .. 1000 milliGRAM(s) Oral every 8 hours  atorvastatin 10 milliGRAM(s) Oral at bedtime  buPROPion XL (24-Hour) . 300 milliGRAM(s) Oral daily  famotidine    Tablet 20 milliGRAM(s) Oral daily  fluticasone propionate 50 MICROgram(s)/spray Nasal Spray 1 Spray(s) Both Nostrils two times a day  levothyroxine 50 MICROGram(s) Oral daily  metoprolol succinate ER 50 milliGRAM(s) Oral daily  pancrelipase  (CREON 24,000 Lipase Units) 2 Capsule(s) Oral three times a day with meals  polyethylene glycol 3350 17 Gram(s) Oral daily  polyethylene glycol 3350 17 Gram(s) Oral daily  senna 2 Tablet(s) Oral at bedtime  sertraline 150 milliGRAM(s) Oral daily    MEDICATIONS  (PRN):    aluminum hydroxide/magnesium hydroxide/simethicone Suspension 30 milliLiter(s) Oral every 12 hours PRN Indigestion  cyclobenzaprine 10 milliGRAM(s) Oral every 8 hours PRN Muscle Spasm  HYDROmorphone  Injectable 0.5 milliGRAM(s) IV Push every 3 hours PRN Breakthrough pain  magnesium hydroxide Suspension 30 milliLiter(s) Oral every 12 hours PRN Constipation  ondansetron Injectable 4 milliGRAM(s) IV Push every 6 hours PRN Nausea and/or Vomiting  oxyCODONE    IR 5 milliGRAM(s) Oral every 3 hours PRN Moderate Pain (4 - 6)  oxyCODONE    IR 10 milliGRAM(s) Oral every 3 hours PRN Severe Pain (7 - 10)    Allergies    penicillins (Rash)    REVIEW OF SYSTEMS:    CONSTITUTIONAL: No fever  EYES: No eye pain,   ENMT:  No sinus or throat pain  NECK: No pain or stiffness  RESPIRATORY: No cough, No hemoptysis; No shortness of breath  CARDIOVASCULAR: No acute chest pain, palpitations,  or leg swelling  GASTROINTESTINAL: No abdominal pain. No nausea, vomiting, or hematemesis;  No melena or hematochezia.  GENITOURINARY: No  hematuria, or incontinence  MUSCULOSKELETAL: No joint swelling; Had Lumbar sx  SKIN: No itching, rashes, or lesions   LYMPH NODES: No enlarged glands  NEUROLOGICAL: No headaches, memory loss,   PSYCHIATRIC: No depression, anxiety, mood swings, or difficulty sleeping  ENDOCRINE: No heat or cold intolerance;   HEME/LYMPH: No easy bruising, or bleeding gums  Allergy/Immunology. Medication allergies reviewed    PHYSICAL EXAM:  Vital Signs Last 24 Hrs  T(F): 98.3 (10-31-24 @ 08:25)  HR: 62 (10-31-24 @ 08:25)  BP: 96/47 (10-31-24 @ 08:25)  RR: 17 (10-31-24 @ 08:25)    GENERAL: NAD, well-groomed, well-developed  HEAD:  Atraumatic, Normocephalic  EYES: EOMI, PERRLA, conjunctiva and sclera clear  NECK: Supple, No JVD    On Neurological Examination:    Mental Status - Pt is alert, awake, oriented X3. Higher functions are intact. Follows commands well and able to answer questions appropriately.    Speech -  Normal. Pt has no aphasia.    Cranial Nerves - Pupils 3 mm equal and reactive to light, extraocular eye movements intact. Pt has no visual field deficit.  No facial asymmetry. Tongue - is in midline.    Motor Exam - 4/5 all over, No drift. No shaking or tremors.     Sensory Exam -  Pt withdraws all extremities equally on stimulation. No complaints of tingling, numbness.    Gait - To be tested with PT to prevent fall    Deep tendon Reflexes - 2 plus all over.    Coordination - Fine finger movements are normal on both sides. Finger to nose is also normal on both sides.       Neck Supple -  Yes.    LABS:                        10.1   7.07  )-----------( 165      ( 30 Oct 2024 08:17 )             31.4     10-30    143  |  106  |  19  ----------------------------<  132[H]  3.4[L]   |  28  |  1.14    Ca    8.7      30 Oct 2024 08:17    Urinalysis Basic - ( 30 Oct 2024 08:17 )    Color: x / Appearance: x / SG: x / pH: x  Gluc: 132 mg/dL / Ketone: x  / Bili: x / Urobili: x   Blood: x / Protein: x / Nitrite: x   Leuk Esterase: x / RBC: x / WBC x   Sq Epi: x / Non Sq Epi: x / Bacteria: x    RADIOLOGY & ADDITIONAL STUDIES:    r

## 2024-11-01 NOTE — PROGRESS NOTE ADULT - ASSESSMENT
72 yo female, pmh of depression, hypertension, hld, obesity s/p gastric lap on pancreatic enzymes, hypothyroidism, presenting for lumbar stenosis s/p lumbar laminectomy with pedicle screw fusion.      lumbar stenosis s/p laminectomy, pedicle screw fusion  - pain control - standing tylenol 1000 q8h,  - cyclobenzaprine 10 tid prn for muscle spasm  oxycodone 5/10 prn for mod/severe pain   - bowel regimen   - pt/ot  - no medical contraindication to DC to [facility]    HTN - continue to hold home hydrochlorothiazide, monitor at [facility] on DC, continue toprol with holding parameters     hypothyroidism - continue synthroid 50 mcg daily    depression - continue buproprion, sertraline    s/p gastric lap - continue creon    hld - continue lipitor    gi ppx - pepcid

## 2024-11-01 NOTE — PROGRESS NOTE ADULT - SUBJECTIVE AND OBJECTIVE BOX
Patient is a 73y old  Female who presents with a chief complaint of Low back pain (31 Oct 2024 15:11)      INTERVAL HPI/OVERNIGHT EVENTS:  Patient seen and examined, feels well, no current complaints, denies dizziness, fever, nausea, vomiting, headaches.     ROS reviewed and is otherwise negative        Vital Signs Last 24 Hrs  T(C): 37 (01 Nov 2024 07:57), Max: 37.6 (31 Oct 2024 23:07)  T(F): 98.6 (01 Nov 2024 07:57), Max: 99.6 (31 Oct 2024 23:07)  HR: 71 (01 Nov 2024 07:57) (66 - 75)  BP: 104/65 (01 Nov 2024 07:57) (104/65 - 131/74)  BP(mean): --  RR: 16 (01 Nov 2024 07:57) (16 - 16)  SpO2: 96% (01 Nov 2024 07:57) (92% - 96%)    Parameters below as of 01 Nov 2024 07:57  Patient On (Oxygen Delivery Method): room air        PHYSICAL EXAM:  GENERAL: NAD, well-groomed,  elderly female  HEAD:  Atraumatic, Normocephalic  EYES: EOMI, PERRLA  ENMT: Moist mucous membranes, No lesions  NECK: Supple, No JVD, Normal thyroid  NERVOUS SYSTEM:  Alert & Oriented X3, Good concentration; All 4 extremities mobile  CHEST/LUNG: Clear to auscultation bilaterally; No rales, rhonchi, wheezing, or rubs  HEART: Regular rate and rhythm; No murmurs, rubs, or gallops  ABDOMEN: Soft, Nontender, Nondistended; Bowel sounds present  EXTREMITIES:  + Pulses, No clubbing      MEDICATIONS  (STANDING):  acetaminophen     Tablet .. 1000 milliGRAM(s) Oral every 8 hours  atorvastatin 10 milliGRAM(s) Oral at bedtime  buPROPion XL (24-Hour) . 300 milliGRAM(s) Oral daily  famotidine    Tablet 20 milliGRAM(s) Oral daily  fluticasone propionate 50 MICROgram(s)/spray Nasal Spray 1 Spray(s) Both Nostrils two times a day  levothyroxine 50 MICROGram(s) Oral daily  metoprolol succinate ER 50 milliGRAM(s) Oral daily  pancrelipase  (CREON 24,000 Lipase Units) 2 Capsule(s) Oral three times a day with meals  polyethylene glycol 3350 17 Gram(s) Oral daily  polyethylene glycol 3350 17 Gram(s) Oral daily  senna 2 Tablet(s) Oral at bedtime  sertraline 150 milliGRAM(s) Oral daily    MEDICATIONS  (PRN):  aluminum hydroxide/magnesium hydroxide/simethicone Suspension 30 milliLiter(s) Oral every 12 hours PRN Indigestion  cyclobenzaprine 10 milliGRAM(s) Oral every 8 hours PRN Muscle Spasm  HYDROmorphone  Injectable 0.5 milliGRAM(s) IV Push every 3 hours PRN Breakthrough pain  magnesium hydroxide Suspension 30 milliLiter(s) Oral every 12 hours PRN Constipation  ondansetron Injectable 4 milliGRAM(s) IV Push every 6 hours PRN Nausea and/or Vomiting  oxyCODONE    IR 5 milliGRAM(s) Oral every 3 hours PRN Moderate Pain (4 - 6)  oxyCODONE    IR 10 milliGRAM(s) Oral every 3 hours PRN Severe Pain (7 - 10)      Allergies    penicillins (Rash)  penicillin (Rash)    Intolerances          LABS:              CAPILLARY BLOOD GLUCOSE          RADIOLOGY & ADDITIONAL TESTS:        Care Discussed with Consultants/Other Providers:    Advanced Directives: [ ] DNR  [ ] No feeding tube  [ ] MOLST in chart  [ ] MOLST completed today  [ ] Unknown

## 2024-11-01 NOTE — PROGRESS NOTE ADULT - PROVIDER SPECIALTY LIST ADULT
Hospitalist
Hospitalist
Neurology
Orthopedics
Pharmacy
Hospitalist
Orthopedics

## 2024-11-20 PROCEDURE — 80053 COMPREHEN METABOLIC PANEL: CPT

## 2024-11-20 PROCEDURE — 86901 BLOOD TYPING SEROLOGIC RH(D): CPT

## 2024-11-20 PROCEDURE — 36415 COLL VENOUS BLD VENIPUNCTURE: CPT

## 2024-11-20 PROCEDURE — 86850 RBC ANTIBODY SCREEN: CPT

## 2024-11-20 PROCEDURE — 86900 BLOOD TYPING SEROLOGIC ABO: CPT

## 2024-11-20 PROCEDURE — G0463: CPT

## 2024-11-20 PROCEDURE — 85730 THROMBOPLASTIN TIME PARTIAL: CPT

## 2024-11-20 PROCEDURE — 85610 PROTHROMBIN TIME: CPT

## 2024-11-22 ENCOUNTER — APPOINTMENT (OUTPATIENT)
Dept: ORTHOPEDIC SURGERY | Facility: CLINIC | Age: 74
End: 2024-11-22
Payer: MEDICARE

## 2024-11-22 DIAGNOSIS — M51.379 OTH INTVRT DISC DEGEN, LUMBOSACR W/O LUM BCK OR LW EXTRM PN: ICD-10-CM

## 2024-11-22 PROCEDURE — 99024 POSTOP FOLLOW-UP VISIT: CPT

## 2024-11-22 PROCEDURE — 72100 X-RAY EXAM L-S SPINE 2/3 VWS: CPT

## 2024-11-26 PROBLEM — M51.379 DISC DEGENERATION, LUMBOSACRAL: Status: ACTIVE | Noted: 2024-08-21

## 2024-11-26 PROCEDURE — 85025 COMPLETE CBC W/AUTO DIFF WBC: CPT

## 2024-11-26 PROCEDURE — 97110 THERAPEUTIC EXERCISES: CPT

## 2024-11-26 PROCEDURE — 36415 COLL VENOUS BLD VENIPUNCTURE: CPT

## 2024-11-26 PROCEDURE — 76000 FLUOROSCOPY <1 HR PHYS/QHP: CPT

## 2024-11-26 PROCEDURE — 94640 AIRWAY INHALATION TREATMENT: CPT

## 2024-11-26 PROCEDURE — 72100 X-RAY EXAM L-S SPINE 2/3 VWS: CPT

## 2024-11-26 PROCEDURE — C1889: CPT

## 2024-11-26 PROCEDURE — 97116 GAIT TRAINING THERAPY: CPT

## 2024-11-26 PROCEDURE — 97161 PT EVAL LOW COMPLEX 20 MIN: CPT

## 2024-11-26 PROCEDURE — 88311 DECALCIFY TISSUE: CPT

## 2024-11-26 PROCEDURE — 88304 TISSUE EXAM BY PATHOLOGIST: CPT

## 2024-11-26 PROCEDURE — C1713: CPT

## 2024-11-26 PROCEDURE — 97530 THERAPEUTIC ACTIVITIES: CPT

## 2024-11-26 PROCEDURE — 97535 SELF CARE MNGMENT TRAINING: CPT

## 2024-11-26 PROCEDURE — 80048 BASIC METABOLIC PNL TOTAL CA: CPT

## 2024-11-26 PROCEDURE — 97165 OT EVAL LOW COMPLEX 30 MIN: CPT

## 2024-12-13 ENCOUNTER — APPOINTMENT (OUTPATIENT)
Dept: ORTHOPEDIC SURGERY | Facility: CLINIC | Age: 74
End: 2024-12-13
Payer: MEDICARE

## 2024-12-13 DIAGNOSIS — Z98.1 ARTHRODESIS STATUS: ICD-10-CM

## 2024-12-13 DIAGNOSIS — Z98.890 OTHER SPECIFIED POSTPROCEDURAL STATES: ICD-10-CM

## 2024-12-13 PROCEDURE — 72100 X-RAY EXAM L-S SPINE 2/3 VWS: CPT

## 2024-12-13 PROCEDURE — 99024 POSTOP FOLLOW-UP VISIT: CPT

## 2024-12-20 NOTE — H&P PST ADULT - PRO ANTICIPATED DISCH DISP
-- DO NOT REPLY / DO NOT REPLY ALL --  -- This inbox is not monitored. If this was sent to the wrong provider or department, reroute message to P [facility] Reroute pool. --  -- Message is from [facility]agement [facility] [employer] (ECO) --    General Patient Message: Patient will like an call back regarding results    Caller Information       Contact Date/Time Type Contact Phone/Fax    12/16/2024 08:33 AM CST Phone (Incoming) MARIA LUISA [phone]    12/19/2024 11:43 AM CST Phone (Incoming) florentino (Self) [phone]            Alternative phone number: none     Can a detailed message be left? Yes - Voicemail   Patient has been advised the message will be addressed within 2-3 business days.                
-- DO NOT REPLY / DO NOT REPLY ALL --  -- This inbox is not monitored. If this was sent to the wrong provider or department, reroute message to P [facility] Reroute pool. --  -- Message is from [facility]agement [facility] Operations ([facility]O) --    Request Result      Which Result are your requesting?  HEART SCAN AND XRAY    What is the full name of the provider that ordered the lab or test?: [facility] site name: [place]     Caller Information       Contact Date/Time Type Contact Phone/Fax    12/16/2024 08:33 AM CST Phone (Incoming) MARIA LUISA [phone]            Alternative phone number: NO    Can a detailed message be left?: Yes - Voicemail     Patient has been advised the message will be addressed within 2-3 business days.      Copied from CRM #[id number]. Topic: MW Messaging - MW Patient Request  >> Dec 16, 2024  8:34 AM Augie YEAGER wrote:  MARIA LUISA called requesting to send a general message to clinician.   Verified issue is NOT regarding a symptom(s) requiring routine or emergent triage. Verified another message template type and CRM does not apply.    Selected 'Wrap Up CRM' and created new Telephone Encounter after clicking 'Convert to Clinical Call'. Selected appropriate Reason for Call.  Sent Pt message template and routed as routine priority per Clinician KB page to appropriate clinician pool. Readback full message.  
Copied from CRM #[id number]. Topic: MW Messaging - MW Patient Request  >> Dec 20, 2024  8:19 AM Tadeo RUBI wrote:  Italo calling back 2 or more times regarding the same clinical request of results. If patient is experiencing symptoms - Follow Emergent Symptoms process. This CRM is for non-urgent items ONLY. Consider routine triage when necessary. Confirmed there are no notes from Care site in communication history.    Call is past turnaround time expectations. Open encounter; Selected 'Wrap up CRM' and created new Telephone Encounter after clicking 'Convert to Clinical Call'. Sent appropriate message template. Routed as high priority per Clinician KB page to appropriate clinician pool. Readback full message.-- DO NOT REPLY / DO NOT REPLY ALL --  -- This inbox is not monitored. If this was sent to the wrong provider or department, reroute message to P ECO Reroute pool. --  -- Message is from [facility] ([facility]O) --    Request Result     Which Result are your requesting?  Xray and heartscan/pt is very upset and she states this is her third time calling for her results. ECO did reach out through backline but line was not rolled over after 8am and through secure chat and got no response. Pt hung up. Pt would like her results today asap as she is traveling out of town for the holiday. Thank you.    What is the full name of the provider that ordered the lab or test?: [facility] site name: [facility] [address]    Caller Information       Contact Date/Time Type Contact Phone/Fax    12/16/2024 08:33 AM CST Phone (Incoming) LAMBERTODEONTE [phone]    12/19/2024 11:43 AM CST Phone (Incoming) florentino (Self) [phone]    12/20/2024 08:08 AM CST Phone (Incoming) Lambertodeonte [phone]            Alternative phone number: no    Can a detailed message be left?: No    Patient has been advised the message will be addressed within 2-3 business days.        
Copied from CRM #[id number]. Topic: MW Messaging - MW Patient Request  >> Dec 20, 2024  8:24 AM Marija MCKEON wrote:  Maria Luisa Urbina called requesting to send a general message to clinician.   Verified issue is NOT regarding a symptom(s) requiring routine or emergent triage. Verified another message template type and CRM does not apply.    Selected 'Wrap Up CRM' and created new Telephone Encounter after clicking 'Convert to Clinical Call'. Selected appropriate Reason for Call.  Sent Pt message template and routed as routine priority per Clinician KB page to appropriate clinician pool. Readback full message.-- DO NOT REPLY / DO NOT REPLY ALL --  -- This inbox is not monitored. If this was sent to the wrong provider or department, reroute message to P ECO Reroute pool. --  -- Message is from [facility] [employer] ([facility]O) --    General Patient Message: Patient calling for results. Per Dr Medina in secure chat call back line. Back line not open yet. Per Dr Medina let patient know that he will call her   Caller Information       Contact Date/Time Type Contact Phone/Fax    12/16/2024 08:33 AM CST Phone (Incoming) MARIA LUISA [phone]    12/19/2024 11:43 AM CST Phone (Incoming) florentino (Self) [phone]    12/20/2024 08:08 AM CST Phone (Incoming) Maria Luisa [phone]    12/20/2024 08:20 AM CST Phone (Incoming) Maria Luisa Urbina [phone]            Alternative phone number:     Can a detailed message be left? No  Patient has been advised the message will be addressed within 2-3 business days.                
Discussed with patient lab/xray results at length.  Recommend she take her statin every day and repeat lipid panel in 2 months.  Discussed with patient that I have sent her results through the live well portal but apparently she is no longer using that which is why she did not have any results  
home

## 2025-01-07 ENCOUNTER — APPOINTMENT (OUTPATIENT)
Dept: ORTHOPEDIC SURGERY | Facility: CLINIC | Age: 75
End: 2025-01-07
Payer: MEDICARE

## 2025-01-07 VITALS — BODY MASS INDEX: 29.45 KG/M2 | WEIGHT: 156 LBS | HEIGHT: 61 IN

## 2025-01-07 DIAGNOSIS — M76.821 POSTERIOR TIBIAL TENDINITIS, RIGHT LEG: ICD-10-CM

## 2025-01-07 DIAGNOSIS — Z00.00 ENCOUNTER FOR GENERAL ADULT MEDICAL EXAMINATION W/OUT ABNORMAL FINDINGS: ICD-10-CM

## 2025-01-07 PROCEDURE — 73630 X-RAY EXAM OF FOOT: CPT | Mod: RT

## 2025-01-07 PROCEDURE — L1902: CPT | Mod: KX,RT

## 2025-01-07 PROCEDURE — 99204 OFFICE O/P NEW MOD 45 MIN: CPT

## 2025-01-24 ENCOUNTER — APPOINTMENT (OUTPATIENT)
Dept: ORTHOPEDIC SURGERY | Facility: CLINIC | Age: 75
End: 2025-01-24
Payer: MEDICARE

## 2025-01-24 DIAGNOSIS — Z98.890 OTHER SPECIFIED POSTPROCEDURAL STATES: ICD-10-CM

## 2025-01-24 PROCEDURE — 72100 X-RAY EXAM L-S SPINE 2/3 VWS: CPT

## 2025-01-24 PROCEDURE — 99024 POSTOP FOLLOW-UP VISIT: CPT

## 2025-01-30 ENCOUNTER — TRANSCRIPTION ENCOUNTER (OUTPATIENT)
Age: 75
End: 2025-01-30

## 2025-01-31 ENCOUNTER — APPOINTMENT (OUTPATIENT)
Dept: ORTHOPEDIC SURGERY | Facility: CLINIC | Age: 75
End: 2025-01-31
Payer: MEDICARE

## 2025-01-31 DIAGNOSIS — M25.551 PAIN IN RIGHT HIP: ICD-10-CM

## 2025-01-31 DIAGNOSIS — Z98.1 ARTHRODESIS STATUS: ICD-10-CM
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
## 2025-01-31 PROCEDURE — 73501 X-RAY EXAM HIP UNI 1 VIEW: CPT

## 2025-01-31 PROCEDURE — 72170 X-RAY EXAM OF PELVIS: CPT | Mod: 59

## 2025-01-31 PROCEDURE — 99214 OFFICE O/P EST MOD 30 MIN: CPT

## 2025-01-31 PROCEDURE — 72100 X-RAY EXAM L-S SPINE 2/3 VWS: CPT

## 2025-02-14 ENCOUNTER — APPOINTMENT (OUTPATIENT)
Dept: OTOLARYNGOLOGY | Facility: CLINIC | Age: 75
End: 2025-02-14
Payer: MEDICARE

## 2025-02-14 VITALS
HEART RATE: 73 BPM | WEIGHT: 159 LBS | HEIGHT: 61 IN | DIASTOLIC BLOOD PRESSURE: 84 MMHG | BODY MASS INDEX: 30.02 KG/M2 | SYSTOLIC BLOOD PRESSURE: 135 MMHG

## 2025-02-14 DIAGNOSIS — R09.89 OTHER SPECIFIED SYMPTOMS AND SIGNS INVOLVING THE CIRCULATORY AND RESPIRATORY SYSTEMS: ICD-10-CM

## 2025-02-14 DIAGNOSIS — H90.3 SENSORINEURAL HEARING LOSS, BILATERAL: ICD-10-CM

## 2025-02-14 DIAGNOSIS — J31.0 CHRONIC RHINITIS: ICD-10-CM

## 2025-02-14 DIAGNOSIS — R09.81 NASAL CONGESTION: ICD-10-CM

## 2025-02-14 DIAGNOSIS — G47.33 OBSTRUCTIVE SLEEP APNEA (ADULT) (PEDIATRIC): ICD-10-CM

## 2025-02-14 DIAGNOSIS — J34.89 NASAL CONGESTION: ICD-10-CM

## 2025-02-14 DIAGNOSIS — K21.9 GASTRO-ESOPHAGEAL REFLUX DISEASE W/OUT ESOPHAGITIS: ICD-10-CM

## 2025-02-14 PROCEDURE — 99213 OFFICE O/P EST LOW 20 MIN: CPT

## 2025-02-14 RX ORDER — IPRATROPIUM BROMIDE 42 UG/1
0.06 SPRAY, METERED NASAL
Qty: 2 | Refills: 5 | Status: ACTIVE | COMMUNITY
Start: 2025-02-14 | End: 1900-01-01

## 2025-02-19 ENCOUNTER — APPOINTMENT (OUTPATIENT)
Dept: OBGYN | Facility: CLINIC | Age: 75
End: 2025-02-19
Payer: MEDICARE

## 2025-02-19 VITALS
BODY MASS INDEX: 29.83 KG/M2 | SYSTOLIC BLOOD PRESSURE: 132 MMHG | HEIGHT: 61 IN | WEIGHT: 158 LBS | TEMPERATURE: 97.1 F | DIASTOLIC BLOOD PRESSURE: 88 MMHG | OXYGEN SATURATION: 96 % | HEART RATE: 64 BPM

## 2025-02-19 DIAGNOSIS — M81.0 AGE-RELATED OSTEOPOROSIS W/OUT CURRENT PATHOLOGICAL FRACTURE: ICD-10-CM

## 2025-02-19 PROCEDURE — 99212 OFFICE O/P EST SF 10 MIN: CPT | Mod: 25

## 2025-02-19 PROCEDURE — 96372 THER/PROPH/DIAG INJ SC/IM: CPT

## 2025-02-19 RX ORDER — DENOSUMAB 60 MG/ML
60 INJECTION SUBCUTANEOUS
Qty: 1 | Refills: 0 | Status: COMPLETED | OUTPATIENT
Start: 2025-02-19

## 2025-02-19 RX ADMIN — DENOSUMAB 0 MG/ML: 60 INJECTION SUBCUTANEOUS at 00:00

## 2025-03-04 ENCOUNTER — APPOINTMENT (OUTPATIENT)
Dept: ORTHOPEDIC SURGERY | Facility: CLINIC | Age: 75
End: 2025-03-04
Payer: MEDICARE

## 2025-03-04 DIAGNOSIS — M76.821 POSTERIOR TIBIAL TENDINITIS, RIGHT LEG: ICD-10-CM

## 2025-03-04 PROCEDURE — 99213 OFFICE O/P EST LOW 20 MIN: CPT

## 2025-04-25 ENCOUNTER — APPOINTMENT (OUTPATIENT)
Dept: ORTHOPEDIC SURGERY | Facility: CLINIC | Age: 75
End: 2025-04-25
Payer: MEDICARE

## 2025-04-25 DIAGNOSIS — Z98.890 OTHER SPECIFIED POSTPROCEDURAL STATES: ICD-10-CM

## 2025-04-25 DIAGNOSIS — Z98.1 ARTHRODESIS STATUS: ICD-10-CM

## 2025-04-25 PROCEDURE — 72100 X-RAY EXAM L-S SPINE 2/3 VWS: CPT

## 2025-04-25 PROCEDURE — 99214 OFFICE O/P EST MOD 30 MIN: CPT

## 2025-06-18 ENCOUNTER — APPOINTMENT (OUTPATIENT)
Dept: ORTHOPEDIC SURGERY | Facility: CLINIC | Age: 75
End: 2025-06-18
Payer: MEDICARE

## 2025-06-18 PROBLEM — M76.01 GLUTEAL TENDINITIS OF RIGHT BUTTOCK: Status: ACTIVE | Noted: 2025-06-18

## 2025-06-18 PROCEDURE — 72110 X-RAY EXAM L-2 SPINE 4/>VWS: CPT

## 2025-06-18 PROCEDURE — 96372 THER/PROPH/DIAG INJ SC/IM: CPT

## 2025-06-18 PROCEDURE — 99214 OFFICE O/P EST MOD 30 MIN: CPT | Mod: 25

## 2025-06-18 RX ORDER — LIDOCAINE 5% 700 MG/1
5 PATCH TOPICAL
Qty: 1 | Refills: 2 | Status: ACTIVE | COMMUNITY
Start: 2025-06-18 | End: 1900-01-01

## 2025-06-18 RX ADMIN — KETOROLAC TROMETHAMINE 0 MG/ML: 30 INJECTION, SOLUTION INTRAMUSCULAR; INTRAVENOUS at 00:00

## 2025-06-23 ENCOUNTER — TRANSCRIPTION ENCOUNTER (OUTPATIENT)
Age: 75
End: 2025-06-23

## 2025-06-23 RX ORDER — METHYLPREDNISOLONE 4 MG/1
4 TABLET ORAL
Qty: 1 | Refills: 0 | Status: ACTIVE | COMMUNITY
Start: 2025-06-23 | End: 1900-01-01

## 2025-06-23 RX ORDER — TIZANIDINE 2 MG/1
2 TABLET ORAL
Qty: 30 | Refills: 1 | Status: ACTIVE | COMMUNITY
Start: 2025-06-23 | End: 1900-01-01

## 2025-07-31 RX ORDER — DENOSUMAB 60 MG/ML
60 INJECTION SUBCUTANEOUS
Qty: 1 | Refills: 2 | Status: ACTIVE | COMMUNITY
Start: 2025-07-31 | End: 1900-01-01

## 2025-08-14 ENCOUNTER — NON-APPOINTMENT (OUTPATIENT)
Age: 75
End: 2025-08-14

## 2025-08-15 ENCOUNTER — APPOINTMENT (OUTPATIENT)
Dept: OTOLARYNGOLOGY | Facility: CLINIC | Age: 75
End: 2025-08-15
Payer: MEDICARE

## 2025-08-15 VITALS
BODY MASS INDEX: 29.83 KG/M2 | HEART RATE: 66 BPM | WEIGHT: 158 LBS | HEIGHT: 61 IN | SYSTOLIC BLOOD PRESSURE: 120 MMHG | DIASTOLIC BLOOD PRESSURE: 81 MMHG

## 2025-08-15 DIAGNOSIS — J34.2 DEVIATED NASAL SEPTUM: ICD-10-CM

## 2025-08-15 DIAGNOSIS — H91.93 UNSPECIFIED HEARING LOSS, BILATERAL: ICD-10-CM

## 2025-08-15 DIAGNOSIS — J31.0 CHRONIC RHINITIS: ICD-10-CM

## 2025-08-15 DIAGNOSIS — R09.81 NASAL CONGESTION: ICD-10-CM

## 2025-08-15 DIAGNOSIS — J34.89 NASAL CONGESTION: ICD-10-CM

## 2025-08-15 DIAGNOSIS — K11.20 SIALOADENITIS, UNSPECIFIED: ICD-10-CM

## 2025-08-15 PROCEDURE — 99213 OFFICE O/P EST LOW 20 MIN: CPT

## 2025-08-15 PROCEDURE — 92567 TYMPANOMETRY: CPT

## 2025-08-15 PROCEDURE — 92557 COMPREHENSIVE HEARING TEST: CPT

## 2025-08-20 ENCOUNTER — APPOINTMENT (OUTPATIENT)
Dept: OBGYN | Facility: CLINIC | Age: 75
End: 2025-08-20
Payer: MEDICARE

## 2025-08-20 VITALS
HEART RATE: 68 BPM | TEMPERATURE: 97.7 F | WEIGHT: 158 LBS | BODY MASS INDEX: 29.83 KG/M2 | HEIGHT: 61 IN | OXYGEN SATURATION: 95 % | SYSTOLIC BLOOD PRESSURE: 142 MMHG | DIASTOLIC BLOOD PRESSURE: 70 MMHG

## 2025-08-20 DIAGNOSIS — M81.0 AGE-RELATED OSTEOPOROSIS W/OUT CURRENT PATHOLOGICAL FRACTURE: ICD-10-CM

## 2025-08-20 DIAGNOSIS — Z01.419 ENCOUNTER FOR GYNECOLOGICAL EXAMINATION (GENERAL) (ROUTINE) W/OUT ABNORMAL FINDINGS: ICD-10-CM

## 2025-08-20 DIAGNOSIS — R92.30 DENSE BREASTS, UNSPECIFIED: ICD-10-CM

## 2025-08-20 DIAGNOSIS — N95.2 POSTMENOPAUSAL ATROPHIC VAGINITIS: ICD-10-CM

## 2025-08-20 PROCEDURE — G0101: CPT

## (undated) DEVICE — DRSG XEROFORM 1 X 8"

## (undated) DEVICE — SUT POLYSORB 1 27" GS-12 UNDYED

## (undated) DEVICE — DRSG COMBINE 5X9"

## (undated) DEVICE — TUBING SUCTION NON CONDUCTIVE 316X18" STERILE

## (undated) DEVICE — PACK MINOR

## (undated) DEVICE — ABDOMINAL BINDER MED/LG 9" X 36"-64"

## (undated) DEVICE — DRAPE C ARM UNIVERSAL

## (undated) DEVICE — TUBING INFILTRATION

## (undated) DEVICE — PACK MINOR NO DRAPE

## (undated) DEVICE — DRAPE TOWEL BLUE 17" X 24"

## (undated) DEVICE — SUT VICRYL 2-0 27" CT-1 UNDYED

## (undated) DEVICE — DRAPE MAYO STAND 30"

## (undated) DEVICE — SYR LUER LOK 50CC

## (undated) DEVICE — SUT ETHILON 5-0 18" PS-2

## (undated) DEVICE — DRAPE 3/4 SHEET 52X76"

## (undated) DEVICE — SPECIMEN CONTAINER 4OZ

## (undated) DEVICE — SOL INJ LR 1000ML

## (undated) DEVICE — VENODYNE/SCD SLEEVE CALF BARIATRIC

## (undated) DEVICE — POSITIONER STRAP ARMBOARD VELCRO TS-30

## (undated) DEVICE — BLADE SURGICAL #10 CARBON

## (undated) DEVICE — SUT PLAIN GUT 5-0 18" P-3

## (undated) DEVICE — GLV 7 PROTEXIS (WHITE)

## (undated) DEVICE — SYR LUER LOK 10CC

## (undated) DEVICE — DRSG KERLIX MED 6"

## (undated) DEVICE — VENODYNE/SCD SLEEVE CALF MEDIUM

## (undated) DEVICE — SOL IRR POUR NS 0.9% 500ML

## (undated) DEVICE — VENODYNE/SCD SLEEVE CALF LARGE

## (undated) DEVICE — TUBING 10FT W/WAND

## (undated) DEVICE — DRAPE CHEST BREAST 102 X121X78"

## (undated) DEVICE — SOL INJ NS 0.9% 1000ML

## (undated) DEVICE — STAPLER SKIN VISI-STAT 35 WIDE

## (undated) DEVICE — DRSG CURITY GAUZE SPONGE 4 X 4" 12-PLY

## (undated) DEVICE — Device

## (undated) DEVICE — SUT VICRYL 4-0 18" PS-2 UNDYED

## (undated) DEVICE — ELCTR BOVIE TIP NEEDLE INSULATED 2.8" EDGE

## (undated) DEVICE — NDL HYPO SAFE 22G X 1.5" (BLACK)

## (undated) DEVICE — STRYKER BONE MILL & MEDIUM BLADE

## (undated) DEVICE — ABDOMINAL BINDER MED/LG 12" X 45"-62"

## (undated) DEVICE — SUT MONOCRYL 3-0 27" PS-2 UNDYED

## (undated) DEVICE — DRAIN JACKSON PRATT 10MM FLAT 3/4 NO TROCAR

## (undated) DEVICE — DRAIN RESERVOIR FOR JACKSON PRATT 100CC CARDINAL

## (undated) DEVICE — WARMING BLANKET UPPER ADULT

## (undated) DEVICE — STOPCOCK 3 WAY

## (undated) DEVICE — SUT MONOCRYL 5-0 18" P-1 UNDYED

## (undated) DEVICE — MIDAS REX LEGEND MATCH HEAD FLUTED LG BORE 3.0MM X 14CM

## (undated) DEVICE — PACK SPINE

## (undated) DEVICE — SUT MONOSOF 2-0 18" C-15

## (undated) DEVICE — DRAPE COVER TABLE 44X75"

## (undated) DEVICE — SUT MONOCRYL 4-0 27" RB-1 UNDYED

## (undated) DEVICE — STRYKER BONE MILL PREP & CARTRIDGE

## (undated) DEVICE — NDL SPINAL 18G X 3.5" (PINK)

## (undated) DEVICE — ELCTR STRYKER NEPTUNE SMOKE EVACUATION PENCIL (GREEN)

## (undated) DEVICE — ELCTR BOVIE TIP BLADE INSULATED 6.5" EDGE

## (undated) DEVICE — SUT MONOCRYL 4-0 27" PS-2 UNDYED

## (undated) DEVICE — STAPLER COVIDIEN SKIN APPOSE 35

## (undated) DEVICE — DRAPE MICROSCOPE LEICA 26" X 150"

## (undated) DEVICE — PREP DURAPREP 26CC

## (undated) DEVICE — GLV 7.5 PROTEXIS (WHITE)

## (undated) DEVICE — GLV 7.5 PROTEXIS (CREAM) NEU-THERA

## (undated) DEVICE — ELCTR GROUNDING PAD ADULT COVIDIEN

## (undated) DEVICE — SUT MONOCRYL 3-0 18" PS-2 UNDYED

## (undated) DEVICE — DRSG MASTISOL

## (undated) DEVICE — MARKING PEN W RULER / LABELS

## (undated) DEVICE — BLADE SURGICAL #15 CARBON

## (undated) DEVICE — SUT ETHIBOND 1 30" CT-1

## (undated) DEVICE — ELCTR BUTTON SWITCH W EDGE COATED BLADE 3MM

## (undated) DEVICE — POSITIONER JACKSON TABLE HEADREST 7", CHEST, HIP, THIGH PADS

## (undated) DEVICE — SUT VICRYL 2-0 27" SH UNDYED

## (undated) DEVICE — CATH ANGIOCATH 12G

## (undated) DEVICE — DRAPE LAPAROTOMY TRANSVERSE

## (undated) DEVICE — GLV 8.5 PROTEXIS (WHITE)

## (undated) DEVICE — SYR LUER LOK 20CC

## (undated) DEVICE — LAP PAD W RING 18 X 18"

## (undated) DEVICE — DRAPE BACK TABLE COVER BIG CASE

## (undated) DEVICE — DRSG TEGADERM 4X4.75"

## (undated) DEVICE — GLV 8 PROTEXIS (WHITE)

## (undated) DEVICE — WARMING BLANKET LOWER ADULT

## (undated) DEVICE — SOL INJ NS 0.9% 500ML 1-PORT

## (undated) DEVICE — BASIN SET SINGLE

## (undated) DEVICE — SPECIMEN TRAP 70ML

## (undated) DEVICE — DRSG STERISTRIPS 0.5 X 4"

## (undated) DEVICE — TUBING HI-VAC PSI-TEC STERILE

## (undated) DEVICE — ELCTR BOVIE TIP BLADE INSULATED 4" EDGE

## (undated) DEVICE — WARMING BLANKET FULL UNDERBODY

## (undated) DEVICE — SUT VICRYL 3-0 27" SH UNDYED